# Patient Record
Sex: FEMALE | Race: BLACK OR AFRICAN AMERICAN | NOT HISPANIC OR LATINO | Employment: OTHER | URBAN - METROPOLITAN AREA
[De-identification: names, ages, dates, MRNs, and addresses within clinical notes are randomized per-mention and may not be internally consistent; named-entity substitution may affect disease eponyms.]

---

## 2024-09-30 ENCOUNTER — HOSPITAL ENCOUNTER (EMERGENCY)
Facility: HOSPITAL | Age: 59
Discharge: HOME | End: 2024-10-01
Payer: MEDICARE

## 2024-09-30 VITALS
HEIGHT: 62 IN | SYSTOLIC BLOOD PRESSURE: 134 MMHG | BODY MASS INDEX: 30.91 KG/M2 | DIASTOLIC BLOOD PRESSURE: 61 MMHG | RESPIRATION RATE: 20 BRPM | HEART RATE: 82 BPM | TEMPERATURE: 97.6 F | OXYGEN SATURATION: 100 % | WEIGHT: 168 LBS

## 2024-09-30 DIAGNOSIS — M79.641 BILATERAL HAND PAIN: ICD-10-CM

## 2024-09-30 DIAGNOSIS — R22.9 SUBCUTANEOUS NODULES: ICD-10-CM

## 2024-09-30 DIAGNOSIS — M79.642 BILATERAL HAND PAIN: ICD-10-CM

## 2024-09-30 DIAGNOSIS — M79.604 LEG PAIN, BILATERAL: Primary | ICD-10-CM

## 2024-09-30 DIAGNOSIS — M79.605 LEG PAIN, BILATERAL: Primary | ICD-10-CM

## 2024-09-30 PROCEDURE — 99284 EMERGENCY DEPT VISIT MOD MDM: CPT

## 2024-09-30 PROCEDURE — 99283 EMERGENCY DEPT VISIT LOW MDM: CPT

## 2024-10-01 ENCOUNTER — APPOINTMENT (EMERGENCY)
Dept: RADIOLOGY | Facility: HOSPITAL | Age: 59
End: 2024-10-01
Payer: MEDICARE

## 2024-10-01 LAB
ALBUMIN SERPL BCG-MCNC: 3.9 G/DL (ref 3.5–5)
ALP SERPL-CCNC: 68 U/L (ref 34–104)
ALT SERPL W P-5'-P-CCNC: 9 U/L (ref 7–52)
ANION GAP SERPL CALCULATED.3IONS-SCNC: 8 MMOL/L (ref 4–13)
AST SERPL W P-5'-P-CCNC: 14 U/L (ref 13–39)
BASOPHILS # BLD AUTO: 0.02 THOUSANDS/ÂΜL (ref 0–0.1)
BASOPHILS NFR BLD AUTO: 0 % (ref 0–1)
BILIRUB SERPL-MCNC: 0.28 MG/DL (ref 0.2–1)
BUN SERPL-MCNC: 19 MG/DL (ref 5–25)
CALCIUM SERPL-MCNC: 9.5 MG/DL (ref 8.4–10.2)
CHLORIDE SERPL-SCNC: 102 MMOL/L (ref 96–108)
CO2 SERPL-SCNC: 27 MMOL/L (ref 21–32)
CREAT SERPL-MCNC: 0.79 MG/DL (ref 0.6–1.3)
CRP SERPL QL: 46.6 MG/L
EOSINOPHIL # BLD AUTO: 0.05 THOUSAND/ÂΜL (ref 0–0.61)
EOSINOPHIL NFR BLD AUTO: 1 % (ref 0–6)
ERYTHROCYTE [DISTWIDTH] IN BLOOD BY AUTOMATED COUNT: 13.8 % (ref 11.6–15.1)
ERYTHROCYTE [SEDIMENTATION RATE] IN BLOOD: 119 MM/HOUR (ref 0–29)
GFR SERPL CREATININE-BSD FRML MDRD: 82 ML/MIN/1.73SQ M
GLUCOSE SERPL-MCNC: 104 MG/DL (ref 65–140)
HCT VFR BLD AUTO: 30.4 % (ref 34.8–46.1)
HGB BLD-MCNC: 9.6 G/DL (ref 11.5–15.4)
IMM GRANULOCYTES # BLD AUTO: 0.04 THOUSAND/UL (ref 0–0.2)
IMM GRANULOCYTES NFR BLD AUTO: 0 % (ref 0–2)
LYMPHOCYTES # BLD AUTO: 3.1 THOUSANDS/ÂΜL (ref 0.6–4.47)
LYMPHOCYTES NFR BLD AUTO: 31 % (ref 14–44)
MCH RBC QN AUTO: 25.7 PG (ref 26.8–34.3)
MCHC RBC AUTO-ENTMCNC: 31.6 G/DL (ref 31.4–37.4)
MCV RBC AUTO: 82 FL (ref 82–98)
MONOCYTES # BLD AUTO: 0.66 THOUSAND/ÂΜL (ref 0.17–1.22)
MONOCYTES NFR BLD AUTO: 7 % (ref 4–12)
NEUTROPHILS # BLD AUTO: 6.17 THOUSANDS/ÂΜL (ref 1.85–7.62)
NEUTS SEG NFR BLD AUTO: 61 % (ref 43–75)
NRBC BLD AUTO-RTO: 0 /100 WBCS
PLATELET # BLD AUTO: 359 THOUSANDS/UL (ref 149–390)
PMV BLD AUTO: 9.3 FL (ref 8.9–12.7)
POTASSIUM SERPL-SCNC: 4 MMOL/L (ref 3.5–5.3)
PROT SERPL-MCNC: 9.2 G/DL (ref 6.4–8.4)
RBC # BLD AUTO: 3.73 MILLION/UL (ref 3.81–5.12)
SODIUM SERPL-SCNC: 137 MMOL/L (ref 135–147)
WBC # BLD AUTO: 10.04 THOUSAND/UL (ref 4.31–10.16)

## 2024-10-01 PROCEDURE — 85652 RBC SED RATE AUTOMATED: CPT

## 2024-10-01 PROCEDURE — 71046 X-RAY EXAM CHEST 2 VIEWS: CPT

## 2024-10-01 PROCEDURE — 80053 COMPREHEN METABOLIC PANEL: CPT

## 2024-10-01 PROCEDURE — 36415 COLL VENOUS BLD VENIPUNCTURE: CPT

## 2024-10-01 PROCEDURE — 85025 COMPLETE CBC W/AUTO DIFF WBC: CPT

## 2024-10-01 PROCEDURE — 96374 THER/PROPH/DIAG INJ IV PUSH: CPT

## 2024-10-01 PROCEDURE — 86140 C-REACTIVE PROTEIN: CPT

## 2024-10-01 RX ORDER — KETOROLAC TROMETHAMINE 30 MG/ML
15 INJECTION, SOLUTION INTRAMUSCULAR; INTRAVENOUS ONCE
Status: COMPLETED | OUTPATIENT
Start: 2024-10-01 | End: 2024-10-01

## 2024-10-01 RX ORDER — NAPROXEN 500 MG/1
500 TABLET ORAL 2 TIMES DAILY WITH MEALS
Qty: 30 TABLET | Refills: 0 | Status: SHIPPED | OUTPATIENT
Start: 2024-10-01

## 2024-10-01 RX ORDER — METHOCARBAMOL 500 MG/1
500 TABLET, FILM COATED ORAL 2 TIMES DAILY
Qty: 20 TABLET | Refills: 0 | Status: SHIPPED | OUTPATIENT
Start: 2024-10-01

## 2024-10-01 RX ADMIN — KETOROLAC TROMETHAMINE 15 MG: 30 INJECTION, SOLUTION INTRAMUSCULAR at 01:52

## 2024-10-01 NOTE — ED PROVIDER NOTES
Final diagnoses:   Leg pain, bilateral   Bilateral hand pain   Subcutaneous nodules     ED Disposition       ED Disposition   Discharge    Condition   Stable    Date/Time   Tue Oct 1, 2024  1:58 AM    Comment   Emily Mejia discharge to home/self care.                   Assessment & Plan       Medical Decision Making  The patient is a 59 y.o. female who presents to [facility] with a chief complaint of painful bumps to the legs and hands bilaterally  Patient is well-appearing, afebrile with normal vital signs.  Ddx includes but is not limited to erythema nodosum, autoimmune disease, RA, arthritis  Patient reports that she does not take any medications and has had no recent illnesses. Patient is in no acute distress. CBC and CMP without signs of leukocytosis and leukopenia, electrolyte imbalances, abnormal liver or kidney function. CRP and sed rate elevated, concerning for an inflammatory/auto immune process. Discussed the results with the patient and recommended NSAIDs and follow up with rheumatology. Discussed strict return parameters including but not limited to chest pain, shortness of breath, fever, or worsening symptoms. Patient understands and agrees with treatment plana and follow up. Prior to discharge, discharge instructions were discussed with patient at bedside. Patient was provided both verbal and written instructions. Patient is understanding of the discharge instructions and is agreeable to plan of care. Return precautions were discussed with patient bedside, patient verbalized understanding of signs and symptoms that would necessitate return to the ED. All questions were answered. Patient was comfortable with the plan of care and discharged to home.       Problems Addressed:  Bilateral hand pain: acute illness or injury  Leg pain, bilateral: acute illness or injury  Subcutaneous nodules: acute illness or injury    Amount and/or Complexity of Data Reviewed  Labs: ordered.  Radiology:  "ordered.    Risk  Prescription drug management.             Medications   ketorolac (TORADOL) injection 15 mg (15 mg Intravenous Given 10/1/24 0152)       ED Risk Strat Scores                           SBIRT 22yo+      Flowsheet Row Most Recent Value   Initial Alcohol Screen: US AUDIT-C     1. How often do you have a drink containing alcohol? 0 Filed at: 09/30/2024 2311   2. How many drinks containing alcohol do you have on a typical day you are drinking?  0 Filed at: 09/30/2024 2311   3b. FEMALE Any Age, or MALE 65+: How often do you have 4 or more drinks on one occassion? 0 Filed at: 09/30/2024 2311   Audit-C Score 0 Filed at: 09/30/2024 2311   JAMEE: How many times in the past year have you...    Used an illegal drug or used a prescription medication for non-medical reasons? Never Filed at: 09/30/2024 2311                            History of Present Illness       Chief Complaint   Patient presents with    Hand Pain     C/o \"lumps\" on hands that she believes are due to iron infusions. C/o pain        History reviewed. No pertinent past medical history.   History reviewed. No pertinent surgical history.   History reviewed. No pertinent family history.   Social History     Tobacco Use    Smoking status: Never    Smokeless tobacco: Never   Vaping Use    Vaping status: Never Used   Substance Use Topics    Drug use: Yes     Types: Marijuana      E-Cigarette/Vaping    E-Cigarette Use Never User       E-Cigarette/Vaping Substances      I have reviewed and agree with the history as documented.     The patient is a 59 y.o. female with PMHx of anemia who presents to [facility] with a chief complaint of painful bumps to the legs and hands bilaterally. Symptoms began back in February with some lumps on her legs bilaterally and now have also showed up on her hands as well bilaterally. Patient is well-appearing and in no acute distress. Her pain is currently rated as a 4/10 in severity and described as " tenderness to the nodules without radiation. Associated symptoms include joint pain. Symptoms are aggravated with movement and palpitation and alleviating factors include none noted. The patient denies fever, chills, night sweats, chest pain, cough, shortness of breath, sputum, dizziness, lightness, numbness, tingling, falls, trauma, coldness of extremity, gait instability. No other reported symptoms at this time.  Patient denies allergies to anything          History provided by:  Patient   used: No    Hand Pain  Associated symptoms: no abdominal pain, no chest pain, no cough, no ear pain, no fever, no rash, no shortness of breath, no sore throat and no vomiting      History reviewed. No pertinent past medical history.     Review of Systems   Constitutional:  Negative for chills and fever.   HENT:  Negative for ear pain and sore throat.    Eyes:  Negative for pain and visual disturbance.   Respiratory:  Negative for cough and shortness of breath.    Cardiovascular:  Negative for chest pain and palpitations.   Gastrointestinal:  Negative for abdominal pain and vomiting.   Genitourinary:  Negative for dysuria and hematuria.   Musculoskeletal:  Positive for arthralgias. Negative for back pain.   Skin:  Negative for color change and rash.   Neurological:  Negative for seizures and syncope.   All other systems reviewed and are negative.          Objective       ED Triage Vitals   Temperature Pulse Blood Pressure Respirations SpO2 Patient Position - Orthostatic VS   09/30/24 2312 09/30/24 2312 09/30/24 2312 09/30/24 2312 09/30/24 2312 09/30/24 2312   97.6 °F (36.4 °C) 82 134/61 20 100 % Sitting      Temp Source Heart Rate Source BP Location FiO2 (%) Pain Score    09/30/24 2312 09/30/24 2312 09/30/24 2312 -- 10/01/24 0152    Temporal Monitor Left arm  6      Vitals      Date and Time Temp Pulse SpO2 Resp BP Pain Score FACES Pain Rating User   10/01/24 0152 -- -- -- -- -- 6 -- JK   09/30/24 2312 97.6 °F  (36.4 °C) 82 100 % 20 134/61 -- -- AG            Physical Exam  Vitals reviewed.   Constitutional:       General: She is not in acute distress.     Appearance: Normal appearance. She is not ill-appearing or diaphoretic.   HENT:      Head: Normocephalic.      Nose: Nose normal.      Mouth/Throat:      Mouth: Mucous membranes are moist.      Pharynx: Oropharynx is clear.   Eyes:      General: No scleral icterus.     Conjunctiva/sclera: Conjunctivae normal.   Neck:      Vascular: No carotid bruit.   Cardiovascular:      Rate and Rhythm: Normal rate.      Pulses: Normal pulses.   Pulmonary:      Effort: Pulmonary effort is normal. No respiratory distress.      Breath sounds: Normal breath sounds. No stridor. No wheezing, rhonchi or rales.   Chest:      Chest wall: No tenderness.   Abdominal:      General: Abdomen is flat. Bowel sounds are normal.      Tenderness: There is no abdominal tenderness.   Musculoskeletal:         General: Tenderness present. No deformity or signs of injury. Normal range of motion.      Cervical back: Neck supple. No rigidity or tenderness.      Right lower leg: No edema.      Left lower leg: No edema.        Legs:       Comments: Full ROM of the lower extremities, subcutaneous nodules to the bilateral lower extremities, no tenderness to the calves, negative homans sign, pedal pulses 2+, cap refill <2 sec    Full ROM of the upper extremities, subcutaneous nodules to the bilateral hands, tenderness, radial pulses 2+, cap refill <2 sec, sensation and motor intact   Skin:     General: Skin is warm and dry.      Capillary Refill: Capillary refill takes less than 2 seconds.      Coloration: Skin is not jaundiced.      Findings: No bruising or lesion.   Neurological:      Mental Status: She is alert and oriented to person, place, and time. Mental status is at baseline.         Results Reviewed       Procedure Component Value Units Date/Time    Sedimentation rate, automated [[id number]]  (Abnormal)  Collected: 10/01/24 0021    Lab Status: Final result Specimen: Blood from Arm, Right Updated: 10/01/24 0100     Sed Rate 119 mm/hour     Comprehensive metabolic panel [[id number]]  (Abnormal) Collected: 10/01/24 0021    Lab Status: Final result Specimen: Blood from Arm, Right Updated: 10/01/24 0100     Sodium 137 mmol/L      Potassium 4.0 mmol/L      Chloride 102 mmol/L      CO2 27 mmol/L      ANION GAP 8 mmol/L      BUN 19 mg/dL      Creatinine 0.79 mg/dL      Glucose 104 mg/dL      Calcium 9.5 mg/dL      AST 14 U/L      ALT 9 U/L      Alkaline Phosphatase 68 U/L      Total Protein 9.2 g/dL      Albumin 3.9 g/dL      Total Bilirubin 0.28 mg/dL      eGFR 82 ml/min/1.73sq m     Narrative:      National Kid[employer] Foundation guidelines for Chronic Kidney Disease (CKD):     Stage 1 with normal or high GFR (GFR > 90 mL/min/1.73 square meters)    Stage 2 Mild CKD (GFR = 60-89 mL/min/1.73 square meters)    Stage 3A Moderate CKD (GFR = 45-59 mL/min/1.73 square meters)    Stage 3B Moderate CKD (GFR = 30-44 mL/min/1.73 square meters)    Stage 4 Severe CKD (GFR = 15-29 mL/min/1.73 square meters)    Stage 5 End Stage CKD (GFR <15 mL/min/1.73 square meters)  Note: GFR calculation is accurate only with a steady state creatinine    C-reactive protein [[id number]]  (Abnormal) Collected: 10/01/24 0021    Lab Status: Final result Specimen: Blood from Arm, Right Updated: 10/01/24 0100     CRP 46.6 mg/L     CBC and differential [[id number]]  (Abnormal) Collected: 10/01/24 0021    Lab Status: Final result Specimen: Blood from Arm, Right Updated: 10/01/24 0028     WBC 10.04 Thousand/uL      RBC 3.73 Million/uL      Hemoglobin 9.6 g/dL      Hematocrit 30.4 %      MCV 82 fL      MCH 25.7 pg      MCHC 31.6 g/dL      RDW 13.8 %      MPV 9.3 fL      Platelets 359 Thousands/uL      nRBC 0 /100 WBCs      Segmented % 61 %      Immature Grans % 0 %      Lymphocytes % 31 %      Monocytes % 7 %      Eosinophils Relative 1 %      Basophils  Relative 0 %      Absolute Neutrophils 6.17 Thousands/µL      Absolute Immature Grans 0.04 Thousand/uL      Absolute Lymphocytes 3.10 Thousands/µL      Absolute Monocytes 0.66 Thousand/µL      Eosinophils Absolute 0.05 Thousand/µL      Basophils Absolute 0.02 Thousands/µL             XR chest 2 views    (Results Pending)       Procedures    ED Medication and Procedure Management   None     Discharge Medication List as of 10/1/2024  1:59 AM        START taking these medications    Details   methocarbamol (ROBAXIN) 500 mg tablet Take 1 tablet (500 mg total) by mouth 2 (two) times a day, Starting Tue 10/1/2024, Normal      naproxen (Naprosyn) 500 mg tablet Take 1 tablet (500 mg total) by mouth 2 (two) times a day with meals, Starting Tue 10/1/2024, Normal             ED SEPSIS DOCUMENTATION   Time reflects when diagnosis was documented in both MDM as applicable and the Disposition within this note       Time User Action Codes Description Comment    10/1/2024  1:58 AM Nishant Hernandes [M79.604,  M79.605] Leg pain, bilateral     10/1/2024  1:58 AM Nishant Hernandes [M79.641,  M79.642] Bilateral hand pain     10/1/2024  1:58 AM Nishant Hernandes [R22.9] Subcutaneous nodules                  Nishant Hernandes PA-C  10/01/24 0544

## 2024-10-01 NOTE — DISCHARGE INSTRUCTIONS
Follow-up with PCP and rheumatology.  Take medicine as directed.  If any symptoms worsen or new symptoms appear return to the ER.

## 2024-10-08 ENCOUNTER — CONSULT (OUTPATIENT)
Age: 59
End: 2024-10-08
Payer: MEDICARE

## 2024-10-08 VITALS
BODY MASS INDEX: 30 KG/M2 | SYSTOLIC BLOOD PRESSURE: 136 MMHG | TEMPERATURE: 95.1 F | HEART RATE: 68 BPM | WEIGHT: 163 LBS | HEIGHT: 62 IN | OXYGEN SATURATION: 98 % | DIASTOLIC BLOOD PRESSURE: 84 MMHG

## 2024-10-08 DIAGNOSIS — R22.9 SUBCUTANEOUS NODULES: ICD-10-CM

## 2024-10-08 DIAGNOSIS — M79.642 BILATERAL HAND PAIN: ICD-10-CM

## 2024-10-08 DIAGNOSIS — M79.604 LEG PAIN, BILATERAL: ICD-10-CM

## 2024-10-08 DIAGNOSIS — M79.641 BILATERAL HAND PAIN: ICD-10-CM

## 2024-10-08 DIAGNOSIS — M79.605 LEG PAIN, BILATERAL: ICD-10-CM

## 2024-10-08 DIAGNOSIS — D86.9 SARCOIDOSIS: ICD-10-CM

## 2024-10-08 DIAGNOSIS — L52 ERYTHEMA NODOSUM: Primary | ICD-10-CM

## 2024-10-08 PROCEDURE — 99205 OFFICE O/P NEW HI 60 MIN: CPT | Performed by: STUDENT IN AN ORGANIZED HEALTH CARE EDUCATION/TRAINING PROGRAM

## 2024-10-08 RX ORDER — ROSUVASTATIN CALCIUM 5 MG/1
TABLET, COATED ORAL
COMMUNITY
Start: 2024-07-02

## 2024-10-08 RX ORDER — PREDNISONE 5 MG/1
TABLET ORAL
Qty: 44 TABLET | Refills: 0 | Status: SHIPPED | OUTPATIENT
Start: 2024-10-08 | End: 2024-11-01

## 2024-10-08 RX ORDER — ATROPINE SULFATE 0.01 %
DROPS OPHTHALMIC (EYE)
COMMUNITY
Start: 2000-10-07

## 2024-10-08 NOTE — ASSESSMENT & PLAN NOTE
Pt presenting with bilateral LE nodules which are painful and skin discoloration, what looks like lupus pernio on her nose, etc, consistent with erythema nodosum, and possible underlying sarcoidosis. 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   Plan:  Will check inflammatory markers, ACE, vitamin D 1,25  Prednisone taper prescribed  Will refer her to Derm/Rheum clinic for biopsy later this month  Orders:    Sedimentation rate, automated; Future    C-reactive protein; Future    Angiotensin converting enzyme; Future    Vitamin D 1,25 Dihydroxy; Future    predniSONE 5 mg tablet; Take 4 tablets (20 mg total) by mouth daily for 5 days, THEN 2 tablets (10 mg total) daily for 5 days, THEN 1 tablet (5 mg total) daily for 14 days.    Sedimentation rate, automated    C-reactive protein    Angiotensin converting enzyme    Vitamin D 1,25 Dihydroxy

## 2024-10-08 NOTE — PATIENT INSTRUCTIONS
We will have Dermatology clinic call you to schedule biopsy of your leg nodules.      We ordered blood work for you to have done    Take prednisone as prescribed

## 2024-10-08 NOTE — PROGRESS NOTES
Ambulatory Visit  Name: Emily Mejia      : 1965      MRN: [id number]  Encounter Provider: Janice Antoine MD  Encounter Date: 10/8/2024   Encounter department: [facility] RHEUMATOLOGY [facility]    Assessment & Plan  Erythema nodosum  Pt presenting with bilateral LE nodules which are painful and skin discoloration, what looks like lupus pernio on her nose, etc, consistent with erythema nodosum, and possible underlying sarcoidosis. 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   Plan:  Will check inflammatory markers, ACE, vitamin D 1,25  Prednisone taper prescribed  Will refer her to Derm/Rheum clinic for biopsy later this month  Orders:    Sedimentation rate, automated; Future    C-reactive protein; Future    Angiotensin converting enzyme; Future    Vitamin D 1,25 Dihydroxy; Future    predniSONE 5 mg tablet; Take 4 tablets (20 mg total) by mouth daily for 5 days, THEN 2 tablets (10 mg total) daily for 5 days, THEN 1 tablet (5 mg total) daily for 14 days.    Sedimentation rate, automated    C-reactive protein    Angiotensin converting enzyme    Vitamin D 1,25 Dihydroxy    Sarcoidosis  CXR without obvious hilar LAD. Preexisting uveitis and now showing erythema nodosum and possible lupus pernio.  Plan:  As above  Orders:    Sedimentation rate, automated; Future    C-reactive protein; Future    Angiotensin converting enzyme; Future    Vitamin D 1,25 Dihydroxy; Future    predniSONE 5 mg tablet; Take 4 tablets (20 mg total) by mouth daily for 5 days, THEN 2 tablets (10 mg total) daily for 5 days, THEN 1 tablet (5 mg total) daily for 14 days.    Sedimentation rate, automated    C-reactive protein    Angiotensin converting enzyme    Vitamin D 1,25 Dihydroxy      History of Present Illness     Emily Mejia is a 59 y.o. 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 He was recently in the ER with these complaints.  "Symptoms began months ago and she noticed some bumps/nodules on her legs bilaterally which then later showed in her hands. They are tender to touch.     Personally reviewed labs showing elevated , and CRP 46.    During her ED visit a week ago CXR was unremarkable, no LAD.     Patient denies any swollen or tender joints.     She has tenderness and bruise like lesions in the hands palmar/ ulnar side bilaterally.  She also has a new lesion on her nose that is new from a few days ago.     Denies fatigue, weight changes, hair loss, vision changes, erythema or pain in the eyes, sicca symptoms, ulcers in the mouth/genital area, swollen glands, dysphagia, GERD, rashes (including malar, psoriasis, photosensitivity, skin tightening/thickening), bumps under the skin (calcinosis, tophi, RA nodules), Raynaud's, morning stiffness, swollen/tender joints, SOB, pleuritic chest pain, abdominal pain, changes in the stool,  bloody or foamy urine, LE edema, muscle weakness, neuropathy, joint hypermobility     Denies any autoimmune family history.     Denies history of DVT or PE, miscarriages, seizures or strokes.             Review of Systems   Constitutional: Negative.    HENT: Negative.     Eyes:         Blind in both eyes   Respiratory: Negative.     Cardiovascular: Negative.    Gastrointestinal: Negative.    Musculoskeletal:  Positive for arthralgias and joint swelling.   Skin:  Positive for wound.   Hematological:  Bruises/bleeds easily.           Objective     /84   Pulse 68   Temp (!) 95.1 °F (35.1 °C) (Tympanic)   Ht 5' 2\" (1.575 m)   Wt 73.9 kg (163 lb)   SpO2 98%   BMI 29.81 kg/m²     Physical Exam  Constitutional:       Appearance: Normal appearance.   HENT:      Head: Normocephalic.   Eyes:      Comments: Legally blind   Cardiovascular:      Pulses: Normal pulses.      Heart sounds: Normal heart sounds.   Pulmonary:      Effort: Pulmonary effort is normal.      Breath sounds: Normal breath sounds. "   Musculoskeletal:      Comments: MSK Exam  The following areas have been examined today and do not show any signs of synovitis, tenderness, or restricted ROM unless otherwise specified below:  Shoulders  Elbows  Wrists  Hands  Hips  Knees  Ankles - mild tenderness in bilateral posterior ankles  Feet    Skin:     General: Skin is warm and dry.      Findings: Bruising (mild petechiae like findings on ulnar/palmar side of both hands , 2nd digit fingertip of left hand) and lesion (purple lesion on nose, nodules on bilateral legs that are tender to touch) present.

## 2024-10-25 ENCOUNTER — OFFICE VISIT (OUTPATIENT)
Dept: DERMATOLOGY | Facility: CLINIC | Age: 59
End: 2024-10-25

## 2024-10-25 VITALS — TEMPERATURE: 97.2 F | HEIGHT: 62 IN | BODY MASS INDEX: 30 KG/M2 | WEIGHT: 163 LBS

## 2024-10-25 DIAGNOSIS — L52 ERYTHEMA NODOSUM: Primary | ICD-10-CM

## 2024-10-25 PROCEDURE — 88305 TISSUE EXAM BY PATHOLOGIST: CPT | Performed by: DERMATOLOGY

## 2024-10-25 PROCEDURE — 88312 SPECIAL STAINS GROUP 1: CPT | Performed by: DERMATOLOGY

## 2024-10-25 NOTE — PATIENT INSTRUCTIONS
ERYTHEMA NODOSUM      Assessment and Plan:  Based on a thorough discussion of this condition and the management approach to it (including a comprehensive discussion of the known risks, side effects and potential benefits of treatment), the patient (family) agrees to implement the following specific plan:  Recommend seeing a podiatrist for further imaging   Referral placed  Continue to finish course of Prednisone   Punch biopsy performed in office today   Consent form signed in office     What is erythema nodosum?  Erythema nodosum is a skin condition where red lumps form on the shins, and less commonly the thighs and forearms. It is a type of panniculitis, i.e. an inflammatory disorder affecting subcutaneous fat.    Who gets erythema nodosum?  Most cases of erythema nodosum occur between the ages of 20 and 45, with a peak from 20 to 30. It occurs less often in the elderly and in children. It is 3 to 6 times more common in adult women than in adult men. However the sex incidence before puberty is about equal.  Most people with erythema nodosum are otherwise in good health but it is often associated with recent infection or illness.    What are the causes of erythema nodosum?  Erythema nodosum appears to be a hypersensitivity reaction with a number of different causes.    Common causes of erythema nodosum are:  Throat infections; these may be due to streptococccus, or viral in origin.  Other bacterial infections, such as Mycoplasma pneumoniae.  Sarcoidosis; erythema nodosum is often associated with enlargement of the lymph nodes (bihilar lymphadenopathy) in the lungs in sarcoidosis. This is known as Löfgren syndrome. It may result in a dry cough or some shortness of breath.  Tuberculosis (TB); erythema nodosum occurs with the primary infection with TB.   Pregnancy or the oral contraceptive pill; erythema nodosum may occur after the first 2 or 3 cycles on the pill. EN may occur in pregnancy, clear after delivery, then  "recur in subsequent pregnancies.  Other drugs; other drugs which have been reported to cause erythema nodosum include: sulphonamides, saliclyates, other nonsteroidal anti-inflammatory drugs (NSAIDs), bromides, iodides and gold salts.  Inflammatory bowel disease (ulcerative colitis or Crohn disease)  Other causes; there are many other causes of erythema nodosum  Erythema nodosum leprosum is a particular variant of erythema nodosum that affects some people being treated for leprosy, and is more usually called type 2 lepra reaction.    Clinical presentation of erythema nodosum    Symptoms of underlying disease may be present in some patients with erythema nodosum, e.g. sore throat in those with streptococcal infection.    Red lumps appear on the shins or about the knees and ankle. They vary in size between a cherry and a grapefruit and in number from 2 to 50 or more. Usually there are 6-12 lumps on the front and sides of the legs and knees. The thighs, outer aspects of the arms, face and neck are less frequently involved and at these other sites the lesions are smaller and more superficial. The nodules are slightly raised above the surrounding skin; they are hot and painful, bright red when they first appear, later becoming purple then fading through the colour changes of a bruise.    Erythema nodosum nodules continue to erupt for about 10 days. The \"bruising\" colour-change starts in the second week, becomes most marked in the third week, then subsides at any time from the end of the third week to the sixth week. Aching of the legs and swelling of the ankles may persist for some weeks, especially if the patient does not rest up. New crops of erythema nodosum may occur over a number of weeks. Rarely, 2 or 3 large lesions merge to form a crescentic ring, which spreads for some days before fading.    Other symptoms may include:  Fever, general aching and feeling unwell (malaise) especially when the nodules first " occur.  Joint aches or arthralgias occur in over half of those presenting with erythema nodosum, regardless of cause. The knee jonts are almost always affected, the other large joints less commonly. Joint symptoms may persist for months afterwards but always resolve completely.  Conjunctivitis is less frequent.    How is erythema nodosum diagnosed?  The skin disorder is diagnosed clinically and a skin biopsy is often performed. The pathology of erythema nodosum shows inflammation around the septum between lobules of fat in subcutaneous tissue, without vasculitis.    Tests done in patients with erythema nodosum include:  Throat swab  Sputum or gastric washing if TB is suspected  Complete blood count and C-reactive protein (CRP) and/or erythrocyte sedimentation rate (ESR)  ASO titre (a test for streptococcal infection)  Chest X-ray (looking for TB and sarcoidosis)  Virus studies  Yersinia titres  Mantoux test or QuantiFERON gold (tests for TB)    Treatment of erythema nodosum  If an underlying infection is found, this should be treated.  Bed rest is advised if pain and/or swelling is severe.  Firm supportive bandages or light compression stockings should be worn.  Anti-inflammatory medications reduce discomfort  Potassium iodide has been reported to be effective but is not easy to obtain.  Oral tetracyclines have anti-inflammatory properties and may reduce discomfort and duration of disease  Mild cases of erythema nodosum subside in 3 to 6 weeks. Cropping of new lesions may occur within this time, especially if the patient is not resting. Sometimes, erythema nodosum may become a chronic or persistent disorder lasting for 6 months and occasionally for years.    PROCEDURE NOTE:  PUNCH BIOPSY        Procedure Details     Patient informed of the risks (including bleeding,scaring and infection) and benefits of the procedure explained. Verbal and written informed consent obtained. The area was prepped and draped in the  usual fashion. Anesthesia was obtained with 1% lidocaine with epinephrine. The skin was then stretched perpendicular to the skin tension lines and a punch biopsy to an appropriate sampling depth was obtained with a 4 mm & 6 mm punch with a forceps and iris scissors.     Hemostasis was obtained with 4-0 Prolene x 2 sutures.     Complications:  None      Specimen has been sent for review by Dermatopathology.      Plan:  1. Instructed to keep the wound dry and covered for 24-48h and clean thereafter.  2. Warning signs of infection were reviewed.    3. Recommended that the patient use acetaminophen as needed for pain  4. Sutures if any should be removed in 10-14 days      Standard post-procedure care has been explained and has been included in written form within the patient's copy of Informed Consent.

## 2024-10-25 NOTE — PROGRESS NOTES
"[facility] Dermatology Clinic Note     Patient Name: Emily Mejia  Encounter Date: 10/25/24     Have you been cared for by a [facility] Dermatologist in the last 3 years and, if so, which description applies to you?    NO.   I am considered a \"new\" patient and must complete all patient intake questions. I am FEMALE/of child-bearing potential.    REVIEW OF SYSTEMS:  Have you recently had or currently have any of the following? Recent fever or chills? No  Any non-healing wound? No  Are you pregnant or planning to become pregnant? No  Are you currently or planning to be nursing or breast feeding? No   PAST MEDICAL HISTORY:  Have you personally ever had or currently have any of the following?  If \"YES,\" then please provide more detail. Skin cancer (such as Melanoma, Basal Cell Carcinoma, Squamous Cell Carcinoma?  No  Tuberculosis, HIV/AIDS, Hepatitis B or C: No  Radiation Treatment No   HISTORY OF IMMUNOSUPPRESSION:   Do you have a history of any of the following:  Systemic Immunosuppression such as Diabetes, Biologic or Immunotherapy, Chemotherapy, Organ Transplantation, Bone Marrow Transplantation or Prednsione?  No    Answering \"YES\" requires the addition of the dotphrase \"IMMUNOSUPPRESSED\" as the first diagnosis of the patient's visit.   FAMILY HISTORY:  Any \"first degree relatives\" (parent, brother, sister, or child) with the following?    Skin Cancer, Pancreatic or Other Cancer? YES, sister-breast, father-unsure which one    PATIENT EXPERIENCE:    Do you want the Dermatologist to perform a COMPLETE skin exam today including a clinical examination under the \"bra and underwear\" areas?  NO  If necessary, do we have your permission to call and leave a detailed message on your Preferred Phone number that includes your specific medical information?  Yes      No Known Allergies   Current Outpatient Medications:     Atropine Sulfate 0.01 % SOLN, , Disp: , Rfl:     DORZOLAMIDE HCL OP, , Disp: , Rfl:     methocarbamol " (ROBAXIN) 500 mg tablet, Take 1 tablet (500 mg total) by mouth 2 (two) times a day (Patient not taking: Reported on 10/8/2024), Disp: 20 tablet, Rfl: 0    naproxen (Naprosyn) 500 mg tablet, Take 1 tablet (500 mg total) by mouth 2 (two) times a day with meals (Patient not taking: Reported on 10/8/2024), Disp: 30 tablet, Rfl: 0    predniSONE 5 mg tablet, Take 4 tablets (20 mg total) by mouth daily for 5 days, THEN 2 tablets (10 mg total) daily for 5 days, THEN 1 tablet (5 mg total) daily for 14 days., Disp: 44 tablet, Rfl: 0    rosuvastatin (CRESTOR) 5 mg tablet, , Disp: , Rfl:           Whom besides the patient is providing clinical information about today's encounter?   NO ADDITIONAL HISTORIAN (patient alone provided history)    Physical Exam and Assessment/Plan by Diagnosis:    ERYTHEMA NODOSUM    Physical Exam:  Anatomic Location Affected:  bl shins, forearms, hands   Morphological Description:  tender subcutaneous nodules resolving w hyperpigmentation      Latest Reference Range & Units 10/01/24 00:21   Sed Rate 0 - 29 mm/hour 119 (H)   C-REACTIVE PROTEIN <3.0 mg/L 46.6 (H)   (H): Data is abnormally high     Latest Reference Range & Units 10/01/24 00:21   Sodium 135 - 147 mmol/L 137   Potassium 3.5 - 5.3 mmol/L 4.0   Chloride 96 - 108 mmol/L 102   Carbon Dioxide 21 - 32 mmol/L 27   ANION GAP 4 - 13 mmol/L 8   BUN 5 - 25 mg/dL 19   Creatinine 0.60 - 1.30 mg/dL 0.79   GLUCOSE 65 - 140 mg/dL 104   Calcium 8.4 - 10.2 mg/dL 9.5   AST 13 - 39 U/L 14   ALT 7 - 52 U/L 9   ALK PHOS 34 - 104 U/L 68   Total Protein 6.4 - 8.4 g/dL 9.2 (H)   Albumin 3.5 - 5.0 g/dL 3.9   Total Bilirubin 0.20 - 1.00 mg/dL 0.28   GFR, Calculated ml/min/1.73sq m 82   (H): Data is abnormally high     Latest Reference Range & Units 10/01/24 00:21   WBC 4.31 - 10.16 Thousand/uL 10.04   RBC 3.81 - 5.12 Million/uL 3.73 (L)   Hemoglobin 11.5 - 15.4 g/dL 9.6 (L)   Hematocrit 34.8 - 46.1 % 30.4 (L)   MCV 82 - 98 fL 82   MCH 26.8 - 34.3 pg 25.7 (L)   MCHC  31.4 - 37.4 g/dL 31.6   RDW 11.6 - 15.1 % 13.8   Platelet Count 149 - 390 Thousands/uL 359   MPV 8.9 - 12.7 fL 9.3   nRBC /100 WBCs 0   (L): Data is abnormally low      Additional History of Present Condition:  60 yo female with recurrent painful tender nodules on arm and legs. Appeared in March of 2024. Went to ER on 9/30/24 due to severe arm and leg pain. Pt was informed to follow up with Rheumatologist who then told pt to follow up with dermatology for possible biopsy. Currently on prednisone which is helping improve condition, also takes tylenol which helps tenderness. Had labwork and imaging done to r/o sarcoid. CXR w/o bl hilar lymphadenopathy, CRP and sed rate elevated, vit d and ace wnl. Pt also c/o L heel pain after trauma unrelated to rash. Pt also has tender papules of fingertips and palms that are resolving, does not believe they are exacerbated by cold. Pt also reports having a lesion on her nose that has also resolved which was concerning for lupus pernio per rheumatology note. She is completing her prednisone taper which has led to significant improvement. She has one subtle residual lesion on left leg. Arms and right leg lesions primarily just have hyperpigmentation remaining. 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    Assessment and Plan: Skin lesions do appear most likely erythema nodosum or other variant of panniculitis. Hand and nose lesions have now resolved, and etiolgoy unclear. Could represent lupus pernio on nose, or typical perniosis on hands. No lesions to biopsy in these sites today.    Based on a thorough discussion of this condition and the management approach to it (including a comprehensive discussion of the known risks, side effects and potential benefits of treatment), the patient (family) agrees to implement the following specific plan:  Recommend seeing a podiatrist for further imaging of L heel as skin is not issue and pt reports previous trauma   Referral  placed  Continue to finish course of Prednisone as prescribed, counseled on possibility of rebound flare  Rec switching from tylenol to nsaid for antiinflammatory effects, kidneys ok  Telescoping punch biopsy performed in office today to confirm dx of EN from single remaining active indurated nodule on left leg  Will discuss with rheumatology when biopsy results return    What is erythema nodosum?  Erythema nodosum is a skin condition where red lumps form on the shins, and less commonly the thighs and forearms. It is a type of panniculitis, i.e. an inflammatory disorder affecting subcutaneous fat.    Who gets erythema nodosum?  Most cases of erythema nodosum occur between the ages of 20 and 45, with a peak from 20 to 30. It occurs less often in the elderly and in children. It is 3 to 6 times more common in adult women than in adult men. However the sex incidence before puberty is about equal.  Most people with erythema nodosum are otherwise in good health but it is often associated with recent infection or illness.    What are the causes of erythema nodosum?  Erythema nodosum appears to be a hypersensitivity reaction with a number of different causes.    Common causes of erythema nodosum are:  Throat infections; these may be due to streptococccus, or viral in origin.  Other bacterial infections, such as Mycoplasma pneumoniae.  Sarcoidosis; erythema nodosum is often associated with enlargement of the lymph nodes (bihilar lymphadenopathy) in the lungs in sarcoidosis. This is known as Löfgren syndrome. It may result in a dry cough or some shortness of breath.  Tuberculosis (TB); erythema nodosum occurs with the primary infection with TB.   Pregnancy or the oral contraceptive pill; erythema nodosum may occur after the first 2 or 3 cycles on the pill. EN may occur in pregnancy, clear after delivery, then recur in subsequent pregnancies.  Other drugs; other drugs which have been reported to cause erythema nodosum include:  "sulphonamides, saliclyates, other nonsteroidal anti-inflammatory drugs (NSAIDs), bromides, iodides and gold salts.  Inflammatory bowel disease (ulcerative colitis or Crohn disease)  Other causes; there are many other causes of erythema nodosum  Erythema nodosum leprosum is a particular variant of erythema nodosum that affects some people being treated for leprosy, and is more usually called type 2 lepra reaction.    Clinical presentation of erythema nodosum    Symptoms of underlying disease may be present in some patients with erythema nodosum, e.g. sore throat in those with streptococcal infection.    Red lumps appear on the shins or about the knees and ankle. They vary in size between a cherry and a grapefruit and in number from 2 to 50 or more. Usually there are 6-12 lumps on the front and sides of the legs and knees. The thighs, outer aspects of the arms, face and neck are less frequently involved and at these other sites the lesions are smaller and more superficial. The nodules are slightly raised above the surrounding skin; they are hot and painful, bright red when they first appear, later becoming purple then fading through the colour changes of a bruise.    Erythema nodosum nodules continue to erupt for about 10 days. The \"bruising\" colour-change starts in the second week, becomes most marked in the third week, then subsides at any time from the end of the third week to the sixth week. Aching of the legs and swelling of the ankles may persist for some weeks, especially if the patient does not rest up. New crops of erythema nodosum may occur over a number of weeks. Rarely, 2 or 3 large lesions merge to form a crescentic ring, which spreads for some days before fading.    Other symptoms may include:  Fever, general aching and feeling unwell (malaise) especially when the nodules first occur.  Joint aches or arthralgias occur in over half of those presenting with erythema nodosum, regardless of cause. The knee " jonts are almost always affected, the other large joints less commonly. Joint symptoms may persist for months afterwards but always resolve completely.  Conjunctivitis is less frequent.    How is erythema nodosum diagnosed?  The skin disorder is diagnosed clinically and a skin biopsy is often performed. The pathology of erythema nodosum shows inflammation around the septum between lobules of fat in subcutaneous tissue, without vasculitis.    Tests done in patients with erythema nodosum include:  Throat swab  Sputum or gastric washing if TB is suspected  Complete blood count and C-reactive protein (CRP) and/or erythrocyte sedimentation rate (ESR)  ASO titre (a test for streptococcal infection)  Chest X-ray (looking for TB and sarcoidosis)  Virus studies  Yersinia titres  Mantoux test or QuantiFERON gold (tests for TB)    Treatment of erythema nodosum  If an underlying infection is found, this should be treated.  Bed rest is advised if pain and/or swelling is severe.  Firm supportive bandages or light compression stockings should be worn.  Anti-inflammatory medications reduce discomfort  Potassium iodide has been reported to be effective but is not easy to obtain.  Oral tetracyclines have anti-inflammatory properties and may reduce discomfort and duration of disease  Mild cases of erythema nodosum subside in 3 to 6 weeks. Cropping of new lesions may occur within this time, especially if the patient is not resting. Sometimes, erythema nodosum may become a chronic or persistent disorder lasting for 6 months and occasionally for years.    PROCEDURE NOTE:  PUNCH BIOPSY      Performing Physician:     Anatomic Location; Clinical Description with size (cm); Pre-Op Diagnosis:    Specimen A: Left calf; tender subcutaneous nodule; Diff Dx: Erythema Nodosum         Anesthesia: 1% xylocaine with epi       Topical anesthesia: None       Indications: To indicate diagnosis and management plan.    Procedure Details     Patient  informed of the risks (including bleeding,scaring and infection) and benefits of the procedure explained. Verbal and written informed consent obtained. The area was prepped and draped in the usual fashion. Anesthesia was obtained with 1% lidocaine with epinephrine. The skin was then stretched perpendicular to the skin tension lines and a punch biopsy to an appropriate sampling depth was obtained with a 4 mm & 6 mm punch with a forceps and iris scissors.     Hemostasis was obtained with 4-0 Prolene x 2 sutures.     Complications:  None      Specimen has been sent for review by Dermatopathology.      Plan:  1. Instructed to keep the wound dry and covered for 24-48h and clean thereafter.  2. Warning signs of infection were reviewed.    3. Recommended that the patient use acetaminophen as needed for pain  4. Sutures if any should be removed in 10-14 days      Standard post-procedure care has been explained and has been included in written form within the patient's copy of Informed Consent.       Vonnie Bond MD- Dermatology PGY3    Scribe Attestation      I,:  Kylah Marquez MA am acting as a scribe while in the presence of the attending physician.:       I,:  Mela Trivedi MD personally performed the services described in this documentation    as scribed in my presence.:

## 2024-11-06 PROCEDURE — 88305 TISSUE EXAM BY PATHOLOGIST: CPT | Performed by: DERMATOLOGY

## 2024-11-06 PROCEDURE — 88312 SPECIAL STAINS GROUP 1: CPT | Performed by: DERMATOLOGY

## 2024-11-11 ENCOUNTER — CLINICAL SUPPORT (OUTPATIENT)
Dept: DERMATOLOGY | Facility: CLINIC | Age: 59
End: 2024-11-11

## 2024-11-11 DIAGNOSIS — Z48.02 ENCOUNTER FOR REMOVAL OF SUTURES: Primary | ICD-10-CM

## 2024-11-11 PROCEDURE — RECHECK

## 2024-11-11 NOTE — PROGRESS NOTES
"Suture removal    Date/Time: 11/11/2024 10:45 AM    Performed by: Johnna Cedeno RN  Authorized by: Maikel Luther MD  Universal Protocol:  procedure performed by consultantConsent: Verbal consent obtained. Written consent not obtained.  Risks and benefits: risks, benefits and alternatives were discussed  Consent given by: patient  Time out: Immediately prior to procedure a \"time out\" was called to verify the correct patient, procedure, equipment, support staff and site/side marked as required.  Timeout called at: 11/11/2024 10:54 AM.  Patient understanding: patient states understanding of the procedure being performed  Patient consent: the patient's understanding of the procedure matches consent given  Procedure consent: procedure consent matches procedure scheduled  Relevant documents: relevant documents not present or verified  Test results: test results not available  Site marked: the operative site was not marked  Radiology Images displayed and confirmed. If images not available, report reviewed: imaging studies not available  Patient identity confirmed: verbally with patient      Patient location:  Clinic  Location:     Laterality:  Left    Location:  Lower extremity    Lower extremity location: calf.  Procedure details:     Tools used:  Suture removal kit    Wound appearance:  Good wound healing, no sign(s) of infection, clean, moist and pink    Number of sutures removed:  2  Post-procedure details:     Post-removal:  Band-Aid applied (Vaseline applied)    Patient tolerance of procedure:  Tolerated well, no immediate complications      Before suture removal     After suture removal   "

## 2024-11-25 ENCOUNTER — TELEPHONE (OUTPATIENT)
Dept: DERMATOLOGY | Facility: CLINIC | Age: 59
End: 2024-11-25

## 2024-12-05 ENCOUNTER — OFFICE VISIT (OUTPATIENT)
Age: 59
End: 2024-12-05

## 2024-12-05 VITALS
OXYGEN SATURATION: 99 % | WEIGHT: 124 LBS | SYSTOLIC BLOOD PRESSURE: 142 MMHG | TEMPERATURE: 97 F | DIASTOLIC BLOOD PRESSURE: 86 MMHG | HEART RATE: 60 BPM | BODY MASS INDEX: 22.82 KG/M2 | HEIGHT: 62 IN

## 2024-12-05 DIAGNOSIS — L52 ERYTHEMA NODOSUM: Primary | ICD-10-CM

## 2024-12-05 RX ORDER — MELOXICAM 15 MG/1
15 TABLET ORAL DAILY
Qty: 30 TABLET | Refills: 2 | Status: SHIPPED | OUTPATIENT
Start: 2024-12-05

## 2024-12-05 NOTE — PROGRESS NOTES
Name: Emily Mejia      : 1965      MRN: [id number]  Encounter Provider: Janice Antoine MD  Encounter Date: 2024   Encounter department: [facility] [address]  :  Assessment & Plan  Erythema nodosum  Pt with erythema nodosum, biopsy proven, no evidence of underlying sarcoidosis or IBD at this point in time based on clinical symptoms, labs or imaging workup thus far. She did recently take a prednisone taper which was helpful for her pain and did help remove most of the nodules in her legs, however once the prednisone taper was completed she continued to have nodules and return of symptoms.  She has not tried taking scheduled NSAIDs as of yet. She denies any SOB, chest pain or other systemic symptoms at this time.  She has baseline visual impairment but denies any pain or redness in the eyes to suggest any kind of uveitis.  Plan:  Prescribed meloxicam 15 mg daily prn for pain management  No labs needed at this time  RTC 3 mo  If she does not have good improvement with NSAIDs we will consider other therapy options such as colchicine or Plaquenil in the future  Orders:    meloxicam (Mobic) 15 mg tablet; Take 1 tablet (15 mg total) by mouth daily        History of Present Illness     Rash  This is a recurrent problem. The current episode started more than 1 month ago. The problem has been resolved since onset. The rash is characterized by pain and swelling. She was exposed to nothing. Associated symptoms include joint pain. Pertinent negatives include no anorexia or facial edema.     Emily Mejia is a 59 y.o. female who presents for f/u of erythema nodosum.    Rheumatic disease summary  -Pt  initially presented in 2024 with bilateral LE nodules which are painful and skin discoloration, what looks like lupus pernio on her nose, etc, consistent with erythema nodosum, and possible underlying sarcoidosis.She has known hx of uveitis that led to blindness already.   -Pt underwent  "punch biopsy of left leg nodule in October which showed septal panniculitis with septal thickening and granuloma formation and neutrophil inflammation, supportive of a diagnosis of erythema nodosum.  -Labs showed normal ESR and CRP, as well as normal ACE and Vitamin D 1,25  -CXR did not show obvious hilar LAD  -She was given a prednisone taper which did help her symptoms, but then the nodules came back afterwards    No hx of GI issues, or prior IBD diagnosis.     She is not taking regular Nsaids.     She denies any SOB, chest discomfort, rashes, arthralgias or myalgias. She has baseline  visual impairment.           Review of Systems   Constitutional: Negative.    HENT: Negative.     Eyes:         Baseline blindness/visual impairment   Respiratory: Negative.     Cardiovascular: Negative.    Gastrointestinal: Negative.  Negative for anorexia.   Genitourinary: Negative.    Musculoskeletal:  Positive for joint pain.   Skin:  Positive for rash (erythema nodosum nodules in the legs bilaterally).          Objective   /86   Pulse 60   Temp (!) 97 °F (36.1 °C)   Ht 5' 2\" (1.575 m)   Wt 56.2 kg (124 lb)   SpO2 99%   BMI 22.68 kg/m²      Physical Exam  Constitutional:       Appearance: Normal appearance.   HENT:      Head: Normocephalic.   Cardiovascular:      Pulses: Normal pulses.      Heart sounds: Normal heart sounds.   Pulmonary:      Effort: Pulmonary effort is normal.      Breath sounds: Normal breath sounds.   Musculoskeletal:      Comments: MSK Exam  The following areas have been examined today and do not show any signs of synovitis, tenderness, or restricted ROM unless otherwise specified below:  Neck  Shoulders  Back  Elbows  Wrists  Hands  Hips  Knees  Ankles  Feet    Skin:     Findings: Lesion (erythema nodosum nodules in bilateral lower extremities, mostly around knee and anterior shin) present.   Neurological:      Mental Status: She is alert and oriented to person, place, and time. "           Answers submitted by the patient for this visit:  Rash Questionnaire (Submitted on 12/5/2024)  Chief Complaint: Rash  nail changes: No

## 2024-12-05 NOTE — ASSESSMENT & PLAN NOTE
Pt with erythema nodosum, biopsy proven, no evidence of underlying sarcoidosis or IBD at this point in time based on clinical symptoms, labs or imaging workup thus far. She did recently take a prednisone taper which was helpful for her pain and did help remove most of the nodules in her legs, however once the prednisone taper was completed she continued to have nodules and return of symptoms.  She has not tried taking scheduled NSAIDs as of yet. She denies any SOB, chest pain or other systemic symptoms at this time.  She has baseline visual impairment but denies any pain or redness in the eyes to suggest any kind of uveitis.  Plan:  Prescribed meloxicam 15 mg daily prn for pain management  No labs needed at this time  RTC 3 mo  If she does not have good improvement with NSAIDs we will consider other therapy options such as colchicine or Plaquenil in the future  Orders:    meloxicam (Mobic) 15 mg tablet; Take 1 tablet (15 mg total) by mouth daily

## 2025-03-12 ENCOUNTER — TELEPHONE (OUTPATIENT)
Dept: RHEUMATOLOGY | Facility: CLINIC | Age: 60
End: 2025-03-12

## 2025-05-02 ENCOUNTER — HOSPITAL ENCOUNTER (OUTPATIENT)
Dept: RADIOLOGY | Facility: HOSPITAL | Age: 60
End: 2025-05-02
Payer: MEDICARE

## 2025-05-02 ENCOUNTER — APPOINTMENT (OUTPATIENT)
Dept: LAB | Facility: HOSPITAL | Age: 60
End: 2025-05-02
Attending: OPHTHALMOLOGY
Payer: MEDICARE

## 2025-05-02 DIAGNOSIS — H20.012 PRIMARY IRIDOCYCLITIS OF LEFT EYE: ICD-10-CM

## 2025-05-02 PROCEDURE — 86780 TREPONEMA PALLIDUM: CPT

## 2025-05-02 PROCEDURE — 71046 X-RAY EXAM CHEST 2 VIEWS: CPT

## 2025-05-02 PROCEDURE — 36415 COLL VENOUS BLD VENIPUNCTURE: CPT

## 2025-05-02 PROCEDURE — 86592 SYPHILIS TEST NON-TREP QUAL: CPT

## 2025-05-02 PROCEDURE — 82164 ANGIOTENSIN I ENZYME TEST: CPT

## 2025-05-03 LAB — TREPONEMA PALLIDUM IGG+IGM AB [PRESENCE] IN SERUM OR PLASMA BY IMMUNOASSAY: REACTIVE

## 2025-05-05 LAB
ACE SERPL-CCNC: 58 U/L (ref 14–82)
RPR SER QL: NORMAL

## 2025-05-06 LAB
SL AMB TREPONEMA PALLIDUM ANTIBODIES: REACTIVE
T PALLIDUM AB SER QL AGGL: REACTIVE

## 2025-05-21 ENCOUNTER — TELEPHONE (OUTPATIENT)
Dept: INFECTIOUS DISEASES | Facility: CLINIC | Age: 60
End: 2025-05-21

## 2025-05-21 ENCOUNTER — APPOINTMENT (OUTPATIENT)
Dept: LAB | Facility: CLINIC | Age: 60
End: 2025-05-21
Payer: MEDICARE

## 2025-05-21 ENCOUNTER — OFFICE VISIT (OUTPATIENT)
Dept: FAMILY MEDICINE CLINIC | Facility: CLINIC | Age: 60
End: 2025-05-21
Payer: MEDICARE

## 2025-05-21 VITALS
BODY MASS INDEX: 30.55 KG/M2 | HEART RATE: 51 BPM | TEMPERATURE: 97.9 F | HEIGHT: 62 IN | WEIGHT: 166 LBS | DIASTOLIC BLOOD PRESSURE: 82 MMHG | OXYGEN SATURATION: 98 % | SYSTOLIC BLOOD PRESSURE: 148 MMHG

## 2025-05-21 DIAGNOSIS — Z13.29 THYROID DISORDER SCREENING: ICD-10-CM

## 2025-05-21 DIAGNOSIS — Z12.12 SCREENING FOR COLORECTAL CANCER: ICD-10-CM

## 2025-05-21 DIAGNOSIS — E78.2 MIXED HYPERLIPIDEMIA: ICD-10-CM

## 2025-05-21 DIAGNOSIS — Z11.3 SCREENING FOR STDS (SEXUALLY TRANSMITTED DISEASES): ICD-10-CM

## 2025-05-21 DIAGNOSIS — L52 ERYTHEMA NODOSUM: ICD-10-CM

## 2025-05-21 DIAGNOSIS — Z13.1 SCREENING FOR DIABETES MELLITUS: ICD-10-CM

## 2025-05-21 DIAGNOSIS — A53.9 SYPHILIS: Primary | ICD-10-CM

## 2025-05-21 DIAGNOSIS — Z00.00 MEDICARE ANNUAL WELLNESS VISIT, INITIAL: ICD-10-CM

## 2025-05-21 DIAGNOSIS — I10 PRIMARY HYPERTENSION: ICD-10-CM

## 2025-05-21 DIAGNOSIS — A52.3 NEUROSYPHILIS IN ADULT: ICD-10-CM

## 2025-05-21 DIAGNOSIS — Z11.59 NEED FOR HEPATITIS C SCREENING TEST: ICD-10-CM

## 2025-05-21 DIAGNOSIS — Z12.31 ENCOUNTER FOR SCREENING MAMMOGRAM FOR BREAST CANCER: ICD-10-CM

## 2025-05-21 DIAGNOSIS — Z11.4 SCREENING FOR HIV (HUMAN IMMUNODEFICIENCY VIRUS): ICD-10-CM

## 2025-05-21 DIAGNOSIS — H20.12 CHRONIC UVEITIS OF LEFT EYE: ICD-10-CM

## 2025-05-21 DIAGNOSIS — Z12.4 SCREENING FOR CERVICAL CANCER: ICD-10-CM

## 2025-05-21 DIAGNOSIS — Z12.11 SCREENING FOR COLORECTAL CANCER: ICD-10-CM

## 2025-05-21 LAB
ALBUMIN SERPL BCG-MCNC: 4.4 G/DL (ref 3.5–5)
ALP SERPL-CCNC: 88 U/L (ref 34–104)
ALT SERPL W P-5'-P-CCNC: 16 U/L (ref 7–52)
ANION GAP SERPL CALCULATED.3IONS-SCNC: 10 MMOL/L (ref 4–13)
AST SERPL W P-5'-P-CCNC: 21 U/L (ref 13–39)
BASOPHILS # BLD AUTO: 0.02 THOUSANDS/ÂΜL (ref 0–0.1)
BASOPHILS NFR BLD AUTO: 0 % (ref 0–1)
BILIRUB SERPL-MCNC: 0.4 MG/DL (ref 0.2–1)
BUN SERPL-MCNC: 16 MG/DL (ref 5–25)
CALCIUM SERPL-MCNC: 9.7 MG/DL (ref 8.4–10.2)
CHLORIDE SERPL-SCNC: 104 MMOL/L (ref 96–108)
CHOLEST SERPL-MCNC: 214 MG/DL (ref ?–200)
CO2 SERPL-SCNC: 25 MMOL/L (ref 21–32)
CREAT SERPL-MCNC: 0.66 MG/DL (ref 0.6–1.3)
EOSINOPHIL # BLD AUTO: 0.15 THOUSAND/ÂΜL (ref 0–0.61)
EOSINOPHIL NFR BLD AUTO: 2 % (ref 0–6)
ERYTHROCYTE [DISTWIDTH] IN BLOOD BY AUTOMATED COUNT: 15.1 % (ref 11.6–15.1)
EST. AVERAGE GLUCOSE BLD GHB EST-MCNC: 111 MG/DL
GFR SERPL CREATININE-BSD FRML MDRD: 96 ML/MIN/1.73SQ M
GLUCOSE P FAST SERPL-MCNC: 78 MG/DL (ref 65–99)
HBA1C MFR BLD: 5.5 %
HCT VFR BLD AUTO: 34.9 % (ref 34.8–46.1)
HDLC SERPL-MCNC: 78 MG/DL
HGB BLD-MCNC: 11.1 G/DL (ref 11.5–15.4)
IMM GRANULOCYTES # BLD AUTO: 0.03 THOUSAND/UL (ref 0–0.2)
IMM GRANULOCYTES NFR BLD AUTO: 0 % (ref 0–2)
LDLC SERPL CALC-MCNC: 120 MG/DL (ref 0–100)
LYMPHOCYTES # BLD AUTO: 4.34 THOUSANDS/ÂΜL (ref 0.6–4.47)
LYMPHOCYTES NFR BLD AUTO: 50 % (ref 14–44)
MCH RBC QN AUTO: 26.7 PG (ref 26.8–34.3)
MCHC RBC AUTO-ENTMCNC: 31.8 G/DL (ref 31.4–37.4)
MCV RBC AUTO: 84 FL (ref 82–98)
MONOCYTES # BLD AUTO: 0.56 THOUSAND/ÂΜL (ref 0.17–1.22)
MONOCYTES NFR BLD AUTO: 6 % (ref 4–12)
NEUTROPHILS # BLD AUTO: 3.66 THOUSANDS/ÂΜL (ref 1.85–7.62)
NEUTS SEG NFR BLD AUTO: 42 % (ref 43–75)
NONHDLC SERPL-MCNC: 136 MG/DL
NRBC BLD AUTO-RTO: 0 /100 WBCS
PLATELET # BLD AUTO: 265 THOUSANDS/UL (ref 149–390)
PMV BLD AUTO: 10.9 FL (ref 8.9–12.7)
POTASSIUM SERPL-SCNC: 3.7 MMOL/L (ref 3.5–5.3)
PROT SERPL-MCNC: 9.3 G/DL (ref 6.4–8.4)
RBC # BLD AUTO: 4.16 MILLION/UL (ref 3.81–5.12)
SODIUM SERPL-SCNC: 139 MMOL/L (ref 135–147)
TRIGL SERPL-MCNC: 82 MG/DL (ref ?–150)
TSH SERPL DL<=0.05 MIU/L-ACNC: 2.09 UIU/ML (ref 0.45–4.5)
WBC # BLD AUTO: 8.76 THOUSAND/UL (ref 4.31–10.16)

## 2025-05-21 PROCEDURE — 87389 HIV-1 AG W/HIV-1&-2 AB AG IA: CPT

## 2025-05-21 PROCEDURE — 87591 N.GONORRHOEAE DNA AMP PROB: CPT

## 2025-05-21 PROCEDURE — 87491 CHLMYD TRACH DNA AMP PROBE: CPT

## 2025-05-21 PROCEDURE — 85025 COMPLETE CBC W/AUTO DIFF WBC: CPT

## 2025-05-21 PROCEDURE — 80061 LIPID PANEL: CPT

## 2025-05-21 PROCEDURE — G0438 PPPS, INITIAL VISIT: HCPCS

## 2025-05-21 PROCEDURE — 99204 OFFICE O/P NEW MOD 45 MIN: CPT

## 2025-05-21 PROCEDURE — 83036 HEMOGLOBIN GLYCOSYLATED A1C: CPT

## 2025-05-21 PROCEDURE — 36415 COLL VENOUS BLD VENIPUNCTURE: CPT

## 2025-05-21 PROCEDURE — 84443 ASSAY THYROID STIM HORMONE: CPT

## 2025-05-21 PROCEDURE — 86803 HEPATITIS C AB TEST: CPT

## 2025-05-21 PROCEDURE — 80053 COMPREHEN METABOLIC PANEL: CPT

## 2025-05-21 RX ORDER — MELOXICAM 15 MG/1
15 TABLET ORAL DAILY
Qty: 30 TABLET | Refills: 2 | Status: SHIPPED | OUTPATIENT
Start: 2025-05-21

## 2025-05-21 RX ORDER — PREDNISOLONE ACETATE 10 MG/ML
SUSPENSION/ DROPS OPHTHALMIC
COMMUNITY
Start: 2025-04-11

## 2025-05-21 RX ORDER — DORZOLAMIDE HYDROCHLORIDE AND TIMOLOL MALEATE 20; 5 MG/ML; MG/ML
SOLUTION/ DROPS OPHTHALMIC
COMMUNITY
Start: 2025-03-17

## 2025-05-21 NOTE — PATIENT INSTRUCTIONS
Medicare Preventive Visit Patient Instructions  Thank you for completing your Welcome to Medicare Visit or Medicare Annual Wellness Visit today. Your next wellness visit will be due in one year (5/22/2026).  The screening/preventive services that you may require over the next 5-10 years are detailed below. Some tests may not apply to you based off risk factors and/or age. Screening tests ordered at today's visit but not completed yet may show as past due. Also, please note that scanned in results may not display below.  Preventive Screenings:  Service Recommendations Previous Testing/Comments   Colorectal Cancer Screening  * Colonoscopy    * Fecal Occult Blood Test (FOBT)/Fecal Immunochemical Test (FIT)  * Fecal DNA/Cologuard Test  * Flexible Sigmoidoscopy Age: 45-75 years old   Colonoscopy: every 10 years (may be performed more frequently if at higher risk)  OR  FOBT/FIT: every 1 year  OR  Cologuard: every 3 years  OR  Sigmoidoscopy: every 5 years  Screening may be recommended earlier than age 45 if at higher risk for colorectal cancer. Also, an individualized decision between you and your healthcare provider will decide whether screening between the ages of 76-85 would be appropriate. Colonoscopy: Not on file  FOBT/FIT: Not on file  Cologuard: Not on file  Sigmoidoscopy: Not on file          Breast Cancer Screening Age: 40+ years old  Frequency: every 1-2 years  Not required if history of left and right mastectomy Mammogram: Not on file        Cervical Cancer Screening Between the ages of 21-29, pap smear recommended once every 3 years.   Between the ages of 30-65, can perform pap smear with HPV co-testing every 5 years.   Recommendations may differ for women with a history of total hysterectomy, cervical cancer, or abnormal pap smears in past. Pap Smear: Not on file        Hepatitis C Screening Once for adults born between 1945 and 1965  More frequently in patients at high risk for Hepatitis C Hep C Antibody: Not  on file        Diabetes Screening 1-2 times per year if you're at risk for diabetes or have pre-diabetes Fasting glucose: No results in last 5 years (No results in last 5 years)  A1C: No results in last 5 years (No results in last 5 years)  Screening Current   Cholesterol Screening Once every 5 years if you don't have a lipid disorder. May order more often based on risk factors. Lipid panel: Not on file          Other Preventive Screenings Covered by Medicare:  Abdominal Aortic Aneurysm (AAA) Screening: covered once if your at risk. You're considered to be at risk if you have a family history of AAA.  Lung Cancer Screening: covers low dose CT scan once per year if you meet all of the following conditions: (1) Age 55-77; (2) No signs or symptoms of lung cancer; (3) Current smoker or have quit smoking within the last 15 years; (4) You have a tobacco smoking history of at least 20 pack years (packs per day multiplied by number of years you smoked); (5) You get a written order from a healthcare provider.  Glaucoma Screening: covered annually if you're considered high risk: (1) You have diabetes OR (2) Family history of glaucoma OR (3)  aged 50 and older OR (4)  American aged 65 and older  Osteoporosis Screening: covered every 2 years if you meet one of the following conditions: (1) You're estrogen deficient and at risk for osteoporosis based off medical history and other findings; (2) Have a vertebral abnormality; (3) On glucocorticoid therapy for more than 3 months; (4) Have primary hyperparathyroidism; (5) On osteoporosis medications and need to assess response to drug therapy.   Last bone density test (DXA Scan): Not on file.  HIV Screening: covered annually if you're between the age of 15-65. Also covered annually if you are younger than 15 and older than 65 with risk factors for HIV infection. For pregnant patients, it is covered up to 3 times per pregnancy.    Immunizations:  Immunization  Recommendations   Influenza Vaccine Annual influenza vaccination during flu season is recommended for all persons aged >= 6 months who do not have contraindications   Pneumococcal Vaccine   * Pneumococcal conjugate vaccine = PCV13 (Prevnar 13), PCV15 (Vaxneuvance), PCV20 (Prevnar 20)  * Pneumococcal polysaccharide vaccine = PPSV23 (Pneumovax) Adults 19-63 yo with certain risk factors or if 65+ yo  If never received any pneumonia vaccine: recommend Prevnar 20 (PCV20)  Give PCV20 if previously received 1 dose of PCV13 or PPSV23   Hepatitis B Vaccine 3 dose series if at intermediate or high risk (ex: diabetes, end stage renal disease, liver disease)   Respiratory syncytial virus (RSV) Vaccine - COVERED BY MEDICARE PART D  * RSVPreF3 (Arexvy) CDC recommends that adults 60 years of age and older may receive a single dose of RSV vaccine using shared clinical decision-making (SCDM)   Tetanus (Td) Vaccine - COST NOT COVERED BY MEDICARE PART B Following completion of primary series, a booster dose should be given every 10 years to maintain immunity against tetanus. Td may also be given as tetanus wound prophylaxis.   Tdap Vaccine - COST NOT COVERED BY MEDICARE PART B Recommended at least once for all adults. For pregnant patients, recommended with each pregnancy.   Shingles Vaccine (Shingrix) - COST NOT COVERED BY MEDICARE PART B  2 shot series recommended in those 19 years and older who have or will have weakened immune systems or those 50 years and older     Health Maintenance Due:      Topic Date Due   • Hepatitis C Screening  Never done   • HIV Screening  Never done   • Cervical Cancer Screening  Never done   • Breast Cancer Screening: Mammogram  Never done   • Colorectal Cancer Screening  Never done     Immunizations Due:      Topic Date Due   • COVID-19 Vaccine (1 - 2024-25 season) Never done     Advance Directives   What are advance directives?  Advance directives are legal documents that state your wishes and plans  for medical care. These plans are made ahead of time in case you lose your ability to make decisions for yourself. Advance directives can apply to any medical decision, such as the treatments you want, and if you want to donate organs.   What are the types of advance directives?  There are many types of advance directives, and each state has rules about how to use them. You may choose a combination of any of the following:  Living will:  This is a written record of the treatment you want. You can also choose which treatments you do not want, which to limit, and which to stop at a certain time. This includes surgery, medicine, IV fluid, and tube feedings.   Durable power of  for healthcare (DPAHC):  This is a written record that states who you want to make healthcare choices for you when you are unable to make them for yourself. This person, called a proxy, is usually a family member or a friend. You may choose more than 1 proxy.  Do not resuscitate (DNR) order:  A DNR order is used in case your heart stops beating or you stop breathing. It is a request not to have certain forms of treatment, such as CPR. A DNR order may be included in other types of advance directives.  Medical directive:  This covers the care that you want if you are in a coma, near death, or unable to make decisions for yourself. You can list the treatments you want for each condition. Treatment may include pain medicine, surgery, blood transfusions, dialysis, IV or tube feedings, and a ventilator (breathing machine).  Values history:  This document has questions about your views, beliefs, and how you feel and think about life. This information can help others choose the care that you would choose.  Why are advance directives important?  An advance directive helps you control your care. Although spoken wishes may be used, it is better to have your wishes written down. Spoken wishes can be misunderstood, or not followed. Treatments may be  given even if you do not want them. An advance directive may make it easier for your family to make difficult choices about your care.   Depression   Depression  is a medical condition that causes feelings of sadness or hopelessness that do not go away. Depression may cause you to lose interest in things you used to enjoy. These feelings may interfere with your daily life.  Call your local emergency number (911 in the ) if:   You think about harming yourself or someone else.  You have done something on purpose to hurt yourself.  The following resources are available at any time to help you, if needed:   National Suicide Prevention Lifeline: 1-347.775.7131 (8-905-023-TALK)   Suicide Hotline: 1-519.703.7922 (0-761-ZHGQPXI)   For a list of international numbers: https://save.org/find-help/international-resources/  Treatment for depression may include medicine to relieve depression. Medicine is often used together with therapy. Therapy is a way for you to talk about your feelings and anything that may be causing depression. Therapy can be done alone or in a group. It may also be done with family members or a significant other.  Get regular physical activity.    Create a regular sleep schedule.    Eat a variety of healthy foods.    Do not drink alcohol or use drugs.     Weight Management   Why it is important to manage your weight:  Being overweight increases your risk of health conditions such as heart disease, high blood pressure, type 2 diabetes, and certain types of cancer. It can also increase your risk for osteoarthritis, sleep apnea, and other respiratory problems. Aim for a slow, steady weight loss. Even a small amount of weight loss can lower your risk of health problems.  How to lose weight safely:  A safe and healthy way to lose weight is to eat fewer calories and get regular exercise. You can lose up about 1 pound a week by decreasing the number of calories you eat by 500 calories each day.   Healthy meal  plan for weight management:  A healthy meal plan includes a variety of foods, contains fewer calories, and helps you stay healthy. A healthy meal plan includes the following:  Eat whole-grain foods more often.  A healthy meal plan should contain fiber. Fiber is the part of grains, fruits, and vegetables that is not broken down by your body. Whole-grain foods are healthy and provide extra fiber in your diet. Some examples of whole-grain foods are whole-wheat breads and pastas, oatmeal, brown rice, and bulgur.  Eat a variety of vegetables every day.  Include dark, leafy greens such as spinach, kale, marc greens, and mustard greens. Eat yellow and orange vegetables such as carrots, sweet potatoes, and winter squash.   Eat a variety of fruits every day.  Choose fresh or canned fruit (canned in its own juice or light syrup) instead of juice. Fruit juice has very little or no fiber.  Eat low-fat dairy foods.  Drink fat-free (skim) milk or 1% milk. Eat fat-free yogurt and low-fat cottage cheese. Try low-fat cheeses such as mozzarella and other reduced-fat cheeses.  Choose meat and other protein foods that are low in fat.  Choose beans or other legumes such as split peas or lentils. Choose fish, skinless poultry (chicken or turkey), or lean cuts of red meat (beef or pork). Before you cook meat or poultry, cut off any visible fat.   Use less fat and oil.  Try baking foods instead of frying them. Add less fat, such as margarine, sour cream, regular salad dressing and mayonnaise to foods. Eat fewer high-fat foods. Some examples of high-fat foods include french fries, doughnuts, ice cream, and cakes.  Eat fewer sweets.  Limit foods and drinks that are high in sugar. This includes candy, cookies, regular soda, and sweetened drinks.  Exercise:  Exercise at least 30 minutes per day on most days of the week. Some examples of exercise include walking, biking, dancing, and swimming. You can also fit in more physical activity by  taking the stairs instead of the elevator or parking farther away from stores. Ask your healthcare provider about the best exercise plan for you.    © Copyright [employer] 2018 Information is for End User's use only and may not be sold, redistributed or otherwise used for commercial purposes. All illustrations and images included in CareNotes® are the copyrighted property of [employer] or [employer]

## 2025-05-21 NOTE — PROGRESS NOTES
Name: Emily Mejia      : 1965      MRN: [id number]  Encounter Provider: Sarah Hull PA-C  Encounter Date: 2025   Encounter department: [facility]  :  Assessment & Plan  Syphilis  Patient recently went to [facility] for evaluation of left eye blurriness and discomfort. 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 RPR was checked on  which was positive. [facility] recommended she get treatment for neurosyphilis with IV PCN x 14 days. They did offer to admit her to their facilities however pt wanted to see if she could be treated locally instead    I messaged our infectious disease department, Dr. Reece regarding these findings. She advised this is likely non emergent given chronic nature. She advised me to place referral to outpatient infectious disease, and their office would be in touch to schedule the patient for a consult. I did advise pt if she can not get in with our infectious diease department within the week that I'd recommend calling [facility] and facilitating treatment through them which she is agreeable with.     Orders:    Ambulatory Referral to Infectious Disease; Future    Screening for colorectal cancer    Orders:    Ambulatory Referral to Gastroenterology; Future    Encounter for screening mammogram for breast cancer    Orders:    Mammo screening bilateral w 3d and cad; Future    Screening for cervical cancer    Orders:    Ambulatory referral to Obstetrics / Gynecology; Future    Screening for HIV (human immunodeficiency virus)    Orders:    HIV 1/2 AB/AG w Reflex [facility] for 2 yr old and above; Future    Need for hepatitis C screening test    Orders:    Hepatitis C antibody; Future    Screening for STDs (sexually transmitted diseases)  Given recent positive RPR, will check G/C, HIV/Hep C as well     Orders:    Chlamydia/GC amplified DNA by PCR; Future    HIV 1/2 AB/AG w Reflex [facility] for 2 yr old and  above; Future    Hepatitis C antibody; Future    Thyroid disorder screening    Orders:    TSH, 3rd generation with Free T4 reflex; Future    Screening for diabetes mellitus    Orders:    Comprehensive metabolic panel; Future    Hemoglobin A1C; Future    Mixed hyperlipidemia  Reports hx of HLD. Not on any medication currently.   Will check lipid panel.   Discussed dietary recommendation in depth.     Orders:    Lipid panel; Future    Chronic uveitis of left eye  Following with [facility].     Orders:    Ambulatory Referral to Infectious Disease; Future    Neurosyphilis in adult  See plan above.   Defer to infectious disease for management.     Orders:    Ambulatory Referral to Infectious Disease; Future    Medicare annual wellness visit, initial  Presents to establish care and medicare wellness visit  Physical exam unremarkable  Ordered routine labs  Referral to GI placed for colon cancer screening  Ordered mammogram for breast cancer screening  Placed referral to GYN for cervical cancer screening       Primary hypertension  Patient reports hx of HTN. Previously not on medication, but was in the past.   BP today 148/82 mmHg which is not controlled.   I advised her to start checking her BP at home daily and keep a log. Goal of < 140/90 mmHg. I would like her to follow up in the office in 2-4 weeks to review home log. If consisently above goal, would recommend starting on antihypertensive agent.   Check labs before next visit.     Orders:    Comprehensive metabolic panel; Future    CBC and differential; Future    Erythema nodosum  Follows with  rheumatology and dermatology  Takes meloxicam PRN          Depression Screening and Follow-up Plan: Patient's depression screening was positive with a PHQ-2 score of 5. Their PHQ-9 score was 13.   Clincally patient does not have depression. No treatment is required. Pt reports feeling down from recent syphilis dx. Does not feel depression. No SI.       Preventive health issues  were discussed with patient, and age appropriate screening tests were ordered as noted in patient's After Visit Summary. Personalized health advice and appropriate referrals for health education or preventive services given if needed, as noted in patient's After Visit Summary.    History of Present Illness     CC: medicare wellness visit, establish care    Patient presents to establish care and for medicare wellness visit  She is new to the area from [place]   She reports hx of HLD and HTN. Not currently on medication for either.   She was recently found to have erythema nodosum, follows with  rheumatology and dermatology for management.     Recently she went to [facility] for evaluation. 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 RPR was checked on 5/2 which was positive. [facility] recommended she get treatment for neurosyphilis with IV PCN x 14 days. They did offer to admit her to their facilities however pt wanted to see if she could be treated locally instead  Pt does not know where she may have gotten the infection. Reports she has not been sexually active for 5+ years. She denies any genital lesions that she can remember. She is very upset about this and has been making her feel down.        Patient Care Team:  Sarah Hull PA-C as PCP - General (Family Medicine)    Review of Systems   Eyes:  Positive for visual disturbance.   Respiratory:  Negative for chest tightness, shortness of breath and wheezing.    Cardiovascular:  Negative for chest pain and palpitations.   Neurological:  Negative for dizziness, light-headedness and headaches.     Medical History Reviewed by provider this encounter:       Annual Wellness Visit Questionnaire   Emily is here for her Initial Wellness visit.     Health Risk Assessment:   Patient rates overall health as good. Patient feels that their physical health rating is same. Patient is satisfied with their life. Eyesight  was rated as same. Hearing was rated as same. Patient feels that their emotional and mental health rating is slightly worse. Patients states they are never, rarely angry. Patient states they are sometimes unusually tired/fatigued. Pain experienced in the last 7 days has been none. Patient states that she has experienced no weight loss or gain in last 6 months.     Depression Screening:   PHQ-2 Score: 5  PHQ-9 Score: 13      Fall Risk Screening:   In the past year, patient has experienced: history of falling in past year    Number of falls: 1  Injured during fall?: No    Feels unsteady when standing or walking?: No    Worried about falling?: No      Urinary Incontinence Screening:   Patient has not leaked urine accidently in the last six months.     Home Safety:  Patient does not have trouble with stairs inside or outside of their home. Patient has working smoke alarms and has working carbon monoxide detector. Home safety hazards include: none.     Nutrition:   Current diet is Regular.     Medications:   Patient is currently taking over-the-counter supplements. OTC medications include: see medication list. Patient is not able to manage medications.     Activities of Daily Living (ADLs)/Instrumental Activities of Daily Living (IADLs):   Walk and transfer into and out of bed and chair?: Yes  Dress and groom yourself?: Yes    Bathe or shower yourself?: Yes    Feed yourself? Yes  Do your laundry/housekeeping?: No  Manage your money, pay your bills and track your expenses?: No  Make your own meals?: No    Do your own shopping?: No    Previous Hospitalizations:   Any hospitalizations or ED visits within the last 12 months?: No      Advance Care Planning:   Living will: Yes    Durable POA for healthcare: Yes    Advanced directive: Yes    Advanced directive counseling given: Yes      Cognitive Screening:   Provider or family/friend/caregiver concerned regarding cognition?: No    Preventive Screenings      Cardiovascular  Screening:    General: History Lipid Disorder and Risks and Benefits Discussed    Due for: Lipid Panel      Diabetes Screening:     General: Risks and Benefits Discussed    Due for: Blood Glucose      Colorectal Cancer Screening:     General: Risks and Benefits Discussed    Due for: Colonoscopy - Low Risk      Breast Cancer Screening:     General: Risks and Benefits Discussed    Due for: Mammogram        Cervical Cancer Screening:    General: Screening Not Indicated      Osteoporosis Screening:    General: Screening Not Indicated      Abdominal Aortic Aneurysm (AAA) Screening:        General: Screening Not Indicated      Lung Cancer Screening:     General: Screening Not Indicated      Hepatitis C Screening:    General: Risks and Benefits Discussed    Hep C Screening Accepted: Yes      Screening, Brief Intervention, and Referral to Treatment (SBIRT)     Screening  Typical number of drinks in a day: 0  Typical number of drinks in a week: 4  Interpretation: Low risk drinking behavior.    Single Item Drug Screening:  How often have you used an illegal drug (including marijuana) or a prescription medication for non-medical reasons in the past year? never    Single Item Drug Screen Score: 0  Interpretation: Negative screen for possible drug use disorder    Social Drivers of Health     Food Insecurity: No Food Insecurity (5/21/2025)    Nursing - Inadequate Food Risk Classification     Worried About Running Out of Food in the Last Year: Never true     Ran Out of Food in the Last Year: Never true   Transportation Needs: No Transportation Needs (5/21/2025)    PRAPARE - Transportation     Lack of Transportation (Medical): No     Lack of Transportation (Non-Medical): No   Housing Stability: Low Risk  (5/21/2025)    Housing Stability Vital Sign     Unable to Pay for Housing in the Last Year: No     Number of Times Moved in the Last Year: 1     Homeless in the Last Year: No   Utilities: Not At Risk (5/21/2025)    [facility] Utilities     " Threatened with loss of utilities: No     No results found.    Objective   /82   Pulse (!) 51   Temp 97.9 °F (36.6 °C)   Ht 5' 2\" (1.575 m)   Wt 75.3 kg (166 lb)   SpO2 98%   BMI 30.36 kg/m²     Physical Exam  Vitals reviewed.   Constitutional:       General: She is not in acute distress.     Appearance: Normal appearance. She is not ill-appearing or diaphoretic.   HENT:      Head: Normocephalic and atraumatic.      Right Ear: Tympanic membrane, ear canal and external ear normal. There is no impacted cerumen.      Left Ear: Tympanic membrane, ear canal and external ear normal. There is no impacted cerumen.      Nose: Nose normal.      Mouth/Throat:      Mouth: Mucous membranes are moist.      Pharynx: Oropharynx is clear.     Cardiovascular:      Rate and Rhythm: Normal rate and regular rhythm.      Heart sounds: Normal heart sounds. No murmur heard.  Pulmonary:      Effort: Pulmonary effort is normal. No respiratory distress.      Breath sounds: Normal breath sounds. No wheezing, rhonchi or rales.   Abdominal:      General: There is no distension.      Palpations: Abdomen is soft.      Tenderness: There is no abdominal tenderness.     Musculoskeletal:      Cervical back: Neck supple.      Right lower leg: No edema.      Left lower leg: No edema.   Lymphadenopathy:      Cervical: No cervical adenopathy.     Skin:     General: Skin is warm.     Neurological:      General: No focal deficit present.      Mental Status: She is alert.      Gait: Gait normal.     Psychiatric:         Mood and Affect: Mood normal.         "

## 2025-05-21 NOTE — TELEPHONE ENCOUNTER
I called Kaycee back and offered her an appt this Friday, May 23rd with Dr. Siegel in [place]. We strongly recommended to come in then. She said he  has to drive [employer] in and he could not get the day off of work. She then scheduled the appt for 5/29 at 9:00 with  Dr. Siegel in the [place] office. I let her know to call the office with any questions or concerns.

## 2025-05-21 NOTE — ASSESSMENT & PLAN NOTE
Reports hx of HLD. Not on any medication currently.   Will check lipid panel.   Discussed dietary recommendation in depth.     Orders:    Lipid panel; Future

## 2025-05-21 NOTE — TELEPHONE ENCOUNTER
I attempted to call Catarino to schedule her for a consult with Dr. Siegel 5/23 at 8:30 in [place] if she can make that appt.  She was still at another Dr. Phillips and her daughter in law said she will call the office back when they leave there.

## 2025-05-21 NOTE — ASSESSMENT & PLAN NOTE
Patient recently went to [facility] for evaluation of left eye blurriness and discomfort. 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 RPR was checked on 5/2 which was positive. [facility] recommended she get treatment for neurosyphilis with IV PCN x 14 days. They did offer to admit her to their facilities however pt wanted to see if she could be treated locally instead    I messaged our infectious disease department, Dr. Reece regarding these findings. She advised this is likely non emergent given chronic nature. She advised me to place referral to outpatient infectious disease, and their office would be in touch to schedule the patient for a consult. I did advise pt if she can not get in with our infectious diease department within the week that I'd recommend calling [facility] and facilitating treatment through them which she is agreeable with.     Orders:    Ambulatory Referral to Infectious Disease; Future

## 2025-05-21 NOTE — TELEPHONE ENCOUNTER
Kaycee returned call to schedule consult. She stated she was trying to get in touch with her  who would drive patient. I placed her on hold to coordinate with office staff on available appointment times when I returned the line was silent. I disconnected after attempts to speak with her.

## 2025-05-22 ENCOUNTER — TELEPHONE (OUTPATIENT)
Dept: FAMILY MEDICINE CLINIC | Facility: CLINIC | Age: 60
End: 2025-05-22

## 2025-05-22 ENCOUNTER — RESULTS FOLLOW-UP (OUTPATIENT)
Dept: FAMILY MEDICINE CLINIC | Facility: CLINIC | Age: 60
End: 2025-05-22

## 2025-05-22 DIAGNOSIS — E78.2 MIXED HYPERLIPIDEMIA: Primary | ICD-10-CM

## 2025-05-22 LAB
C TRACH DNA SPEC QL NAA+PROBE: NEGATIVE
HCV AB SER QL: NORMAL
HIV 1+2 AB+HIV1 P24 AG SERPL QL IA: NORMAL
N GONORRHOEA DNA SPEC QL NAA+PROBE: NEGATIVE

## 2025-05-22 RX ORDER — ROSUVASTATIN CALCIUM 10 MG/1
10 TABLET, COATED ORAL DAILY
Qty: 100 TABLET | Refills: 3 | Status: SHIPPED | OUTPATIENT
Start: 2025-05-22

## 2025-05-29 ENCOUNTER — HOSPITAL ENCOUNTER (INPATIENT)
Facility: HOSPITAL | Age: 60
LOS: 5 days | Discharge: HOME HEALTH | DRG: 057 | End: 2025-06-03
Attending: EMERGENCY MEDICINE | Admitting: INTERNAL MEDICINE
Payer: MEDICARE

## 2025-05-29 ENCOUNTER — CONSULT (OUTPATIENT)
Age: 60
End: 2025-05-29
Payer: MEDICARE

## 2025-05-29 ENCOUNTER — APPOINTMENT (INPATIENT)
Dept: INTERVENTIONAL RADIOLOGY/VASCULAR | Facility: HOSPITAL | Age: 60
DRG: 057 | End: 2025-05-29
Attending: INTERNAL MEDICINE
Payer: MEDICARE

## 2025-05-29 VITALS
TEMPERATURE: 97.5 F | HEART RATE: 66 BPM | WEIGHT: 166 LBS | BODY MASS INDEX: 29.41 KG/M2 | SYSTOLIC BLOOD PRESSURE: 134 MMHG | DIASTOLIC BLOOD PRESSURE: 76 MMHG | OXYGEN SATURATION: 99 % | HEIGHT: 63 IN

## 2025-05-29 DIAGNOSIS — A52.3 NEUROSYPHILIS: Primary | ICD-10-CM

## 2025-05-29 DIAGNOSIS — L52 ERYTHEMA NODOSUM: ICD-10-CM

## 2025-05-29 DIAGNOSIS — H20.9 UVEITIS: ICD-10-CM

## 2025-05-29 DIAGNOSIS — R03.0 ELEVATED BLOOD PRESSURE READING: ICD-10-CM

## 2025-05-29 PROBLEM — D64.9 CHRONIC ANEMIA: Status: ACTIVE | Noted: 2025-05-29

## 2025-05-29 PROBLEM — E78.5 HYPERLIPIDEMIA: Status: ACTIVE | Noted: 2025-05-29

## 2025-05-29 PROBLEM — Z87.2 HISTORY OF ERYTHEMA NODOSUM: Status: ACTIVE | Noted: 2025-05-29

## 2025-05-29 PROBLEM — H20.10 CHRONIC UVEITIS: Status: ACTIVE | Noted: 2025-05-29

## 2025-05-29 LAB
ALBUMIN SERPL BCG-MCNC: 4.1 G/DL (ref 3.5–5)
ALP SERPL-CCNC: 72 U/L (ref 34–104)
ALT SERPL W P-5'-P-CCNC: 11 U/L (ref 7–52)
ANION GAP SERPL CALCULATED.3IONS-SCNC: 9 MMOL/L (ref 4–13)
APPEARANCE CSF: ABNORMAL
AST SERPL W P-5'-P-CCNC: 16 U/L (ref 13–39)
BASOPHILS # BLD AUTO: 0.02 THOUSANDS/ÂΜL (ref 0–0.1)
BASOPHILS # BLD AUTO: 0.02 THOUSANDS/ÂΜL (ref 0–0.1)
BASOPHILS NFR BLD AUTO: 0 % (ref 0–1)
BASOPHILS NFR BLD AUTO: 0 % (ref 0–1)
BILIRUB SERPL-MCNC: 0.25 MG/DL (ref 0.2–1)
BUN SERPL-MCNC: 29 MG/DL (ref 5–25)
CALCIUM SERPL-MCNC: 9.5 MG/DL (ref 8.4–10.2)
CHLORIDE SERPL-SCNC: 106 MMOL/L (ref 96–108)
CO2 SERPL-SCNC: 25 MMOL/L (ref 21–32)
CREAT SERPL-MCNC: 0.76 MG/DL (ref 0.6–1.3)
EOSINOPHIL # BLD AUTO: 0.08 THOUSAND/ÂΜL (ref 0–0.61)
EOSINOPHIL # BLD AUTO: 0.08 THOUSAND/ÂΜL (ref 0–0.61)
EOSINOPHIL NFR BLD AUTO: 1 % (ref 0–6)
EOSINOPHIL NFR BLD AUTO: 1 % (ref 0–6)
ERYTHROCYTE [DISTWIDTH] IN BLOOD BY AUTOMATED COUNT: 14.6 % (ref 11.6–15.1)
ERYTHROCYTE [DISTWIDTH] IN BLOOD BY AUTOMATED COUNT: 14.6 % (ref 11.6–15.1)
GFR SERPL CREATININE-BSD FRML MDRD: 85 ML/MIN/1.73SQ M
GLUCOSE CSF-MCNC: 58 MG/DL (ref 40–70)
GLUCOSE SERPL-MCNC: 93 MG/DL (ref 65–140)
GRAM STN SPEC: NORMAL
HCT VFR BLD AUTO: 32.5 % (ref 34.8–46.1)
HCT VFR BLD AUTO: 33.2 % (ref 34.8–46.1)
HGB BLD-MCNC: 10.3 G/DL (ref 11.5–15.4)
HGB BLD-MCNC: 10.5 G/DL (ref 11.5–15.4)
IMM GRANULOCYTES # BLD AUTO: 0.02 THOUSAND/UL (ref 0–0.2)
IMM GRANULOCYTES # BLD AUTO: 0.02 THOUSAND/UL (ref 0–0.2)
IMM GRANULOCYTES NFR BLD AUTO: 0 % (ref 0–2)
IMM GRANULOCYTES NFR BLD AUTO: 0 % (ref 0–2)
INR PPP: 0.94 (ref 0.85–1.19)
LYMPHOCYTES # BLD AUTO: 3.47 THOUSANDS/ÂΜL (ref 0.6–4.47)
LYMPHOCYTES # BLD AUTO: 3.69 THOUSANDS/ÂΜL (ref 0.6–4.47)
LYMPHOCYTES NFR BLD AUTO: 38 % (ref 14–44)
LYMPHOCYTES NFR BLD AUTO: 39 % (ref 14–44)
LYMPHOCYTES NFR CSF MANUAL: 63 %
MAGNESIUM SERPL-MCNC: 1.5 MG/DL (ref 1.9–2.7)
MCH RBC QN AUTO: 26.7 PG (ref 26.8–34.3)
MCH RBC QN AUTO: 26.8 PG (ref 26.8–34.3)
MCHC RBC AUTO-ENTMCNC: 31.6 G/DL (ref 31.4–37.4)
MCHC RBC AUTO-ENTMCNC: 31.7 G/DL (ref 31.4–37.4)
MCV RBC AUTO: 84 FL (ref 82–98)
MCV RBC AUTO: 85 FL (ref 82–98)
MONOCYTES # BLD AUTO: 0.54 THOUSAND/ÂΜL (ref 0.17–1.22)
MONOCYTES # BLD AUTO: 0.59 THOUSAND/ÂΜL (ref 0.17–1.22)
MONOCYTES NFR BLD AUTO: 6 % (ref 4–12)
MONOCYTES NFR BLD AUTO: 6 % (ref 4–12)
MONOS+MACROS CSF MANUAL: 7 %
NEUTROPHILS # BLD AUTO: 4.92 THOUSANDS/ÂΜL (ref 1.85–7.62)
NEUTROPHILS # BLD AUTO: 4.97 THOUSANDS/ÂΜL (ref 1.85–7.62)
NEUTROPHILS NFR CSF MANUAL: 30 %
NEUTS SEG NFR BLD AUTO: 54 % (ref 43–75)
NEUTS SEG NFR BLD AUTO: 55 % (ref 43–75)
NRBC BLD AUTO-RTO: 0 /100 WBCS
NRBC BLD AUTO-RTO: 0 /100 WBCS
PLATELET # BLD AUTO: 249 THOUSANDS/UL (ref 149–390)
PLATELET # BLD AUTO: 254 THOUSANDS/UL (ref 149–390)
PMV BLD AUTO: 9.5 FL (ref 8.9–12.7)
PMV BLD AUTO: 9.6 FL (ref 8.9–12.7)
POTASSIUM SERPL-SCNC: 4.1 MMOL/L (ref 3.5–5.3)
PROT CSF-MCNC: 54 MG/DL (ref 15–45)
PROT SERPL-MCNC: 8.4 G/DL (ref 6.4–8.4)
PROTHROMBIN TIME: 13.1 SECONDS (ref 12.3–15)
RBC # BLD AUTO: 3.85 MILLION/UL (ref 3.81–5.12)
RBC # BLD AUTO: 3.93 MILLION/UL (ref 3.81–5.12)
SODIUM SERPL-SCNC: 140 MMOL/L (ref 135–147)
TOTAL CELLS COUNTED BLD: NO
TOTAL CELLS COUNTED SPEC: 30
TUBE # CSF: 4
WBC # BLD AUTO: 9.05 THOUSAND/UL (ref 4.31–10.16)
WBC # BLD AUTO: 9.37 THOUSAND/UL (ref 4.31–10.16)
WBC # CSF AUTO: 7 /UL (ref 0–5)

## 2025-05-29 PROCEDURE — 36415 COLL VENOUS BLD VENIPUNCTURE: CPT | Performed by: EMERGENCY MEDICINE

## 2025-05-29 PROCEDURE — B01BZZZ FLUOROSCOPY OF SPINAL CORD: ICD-10-PCS | Performed by: RADIOLOGY

## 2025-05-29 PROCEDURE — 99204 OFFICE O/P NEW MOD 45 MIN: CPT | Performed by: INTERNAL MEDICINE

## 2025-05-29 PROCEDURE — 82945 GLUCOSE OTHER FLUID: CPT | Performed by: INTERNAL MEDICINE

## 2025-05-29 PROCEDURE — 89051 BODY FLUID CELL COUNT: CPT | Performed by: INTERNAL MEDICINE

## 2025-05-29 PROCEDURE — 62328 DX LMBR SPI PNXR W/FLUOR/CT: CPT

## 2025-05-29 PROCEDURE — 99284 EMERGENCY DEPT VISIT MOD MDM: CPT | Performed by: EMERGENCY MEDICINE

## 2025-05-29 PROCEDURE — 85610 PROTHROMBIN TIME: CPT | Performed by: INTERNAL MEDICINE

## 2025-05-29 PROCEDURE — 86592 SYPHILIS TEST NON-TREP QUAL: CPT | Performed by: INTERNAL MEDICINE

## 2025-05-29 PROCEDURE — 85025 COMPLETE CBC W/AUTO DIFF WBC: CPT | Performed by: EMERGENCY MEDICINE

## 2025-05-29 PROCEDURE — 02HV33Z INSERTION OF INFUSION DEVICE INTO SUPERIOR VENA CAVA, PERCUTANEOUS APPROACH: ICD-10-PCS | Performed by: RADIOLOGY

## 2025-05-29 PROCEDURE — 009U3ZX DRAINAGE OF SPINAL CANAL, PERCUTANEOUS APPROACH, DIAGNOSTIC: ICD-10-PCS | Performed by: RADIOLOGY

## 2025-05-29 PROCEDURE — 80053 COMPREHEN METABOLIC PANEL: CPT | Performed by: EMERGENCY MEDICINE

## 2025-05-29 PROCEDURE — 83735 ASSAY OF MAGNESIUM: CPT | Performed by: EMERGENCY MEDICINE

## 2025-05-29 PROCEDURE — 87070 CULTURE OTHR SPECIMN AEROBIC: CPT | Performed by: INTERNAL MEDICINE

## 2025-05-29 PROCEDURE — 62328 DX LMBR SPI PNXR W/FLUOR/CT: CPT | Performed by: RADIOLOGY

## 2025-05-29 PROCEDURE — NC001 PR NO CHARGE: Performed by: RADIOLOGY

## 2025-05-29 PROCEDURE — 99223 1ST HOSP IP/OBS HIGH 75: CPT | Performed by: INTERNAL MEDICINE

## 2025-05-29 PROCEDURE — 84157 ASSAY OF PROTEIN OTHER: CPT | Performed by: INTERNAL MEDICINE

## 2025-05-29 PROCEDURE — 85025 COMPLETE CBC W/AUTO DIFF WBC: CPT | Performed by: INTERNAL MEDICINE

## 2025-05-29 PROCEDURE — 99284 EMERGENCY DEPT VISIT MOD MDM: CPT

## 2025-05-29 RX ORDER — HYDRALAZINE HYDROCHLORIDE 20 MG/ML
5 INJECTION INTRAMUSCULAR; INTRAVENOUS EVERY 6 HOURS PRN
Status: DISCONTINUED | OUTPATIENT
Start: 2025-05-29 | End: 2025-05-29

## 2025-05-29 RX ORDER — LIDOCAINE HYDROCHLORIDE 10 MG/ML
INJECTION, SOLUTION EPIDURAL; INFILTRATION; INTRACAUDAL; PERINEURAL AS NEEDED
Status: COMPLETED | OUTPATIENT
Start: 2025-05-29 | End: 2025-05-29

## 2025-05-29 RX ORDER — ACETAMINOPHEN 325 MG/1
650 TABLET ORAL EVERY 6 HOURS PRN
Status: DISCONTINUED | OUTPATIENT
Start: 2025-05-29 | End: 2025-06-03 | Stop reason: HOSPADM

## 2025-05-29 RX ORDER — MULTIVITAMIN
1 TABLET ORAL DAILY
COMMUNITY

## 2025-05-29 RX ORDER — ONDANSETRON 2 MG/ML
4 INJECTION INTRAMUSCULAR; INTRAVENOUS EVERY 4 HOURS PRN
Status: DISCONTINUED | OUTPATIENT
Start: 2025-05-29 | End: 2025-06-03 | Stop reason: HOSPADM

## 2025-05-29 RX ORDER — PRAVASTATIN SODIUM 40 MG
80 TABLET ORAL
Status: DISCONTINUED | OUTPATIENT
Start: 2025-05-29 | End: 2025-06-03 | Stop reason: HOSPADM

## 2025-05-29 RX ORDER — HYDRALAZINE HYDROCHLORIDE 20 MG/ML
10 INJECTION INTRAMUSCULAR; INTRAVENOUS EVERY 6 HOURS PRN
Status: DISCONTINUED | OUTPATIENT
Start: 2025-05-29 | End: 2025-06-03 | Stop reason: HOSPADM

## 2025-05-29 RX ADMIN — SODIUM CHLORIDE 4 MILLION UNITS: 9 INJECTION, SOLUTION INTRAVENOUS at 21:53

## 2025-05-29 RX ADMIN — HYDRALAZINE HYDROCHLORIDE 5 MG: 20 INJECTION INTRAMUSCULAR; INTRAVENOUS at 17:56

## 2025-05-29 RX ADMIN — SODIUM CHLORIDE 4 MILLION UNITS: 9 INJECTION, SOLUTION INTRAVENOUS at 16:45

## 2025-05-29 RX ADMIN — SODIUM CHLORIDE 4 MILLION UNITS: 9 INJECTION, SOLUTION INTRAVENOUS at 11:28

## 2025-05-29 RX ADMIN — PRAVASTATIN SODIUM 80 MG: 40 TABLET ORAL at 16:45

## 2025-05-29 RX ADMIN — LIDOCAINE HYDROCHLORIDE 10 ML: 10 INJECTION, SOLUTION EPIDURAL; INFILTRATION; INTRACAUDAL; PERINEURAL at 13:52

## 2025-05-29 RX ADMIN — HYDRALAZINE HYDROCHLORIDE 5 MG: 20 INJECTION INTRAMUSCULAR; INTRAVENOUS at 12:11

## 2025-05-29 RX ADMIN — HYDRALAZINE HYDROCHLORIDE 10 MG: 20 INJECTION INTRAMUSCULAR; INTRAVENOUS at 22:02

## 2025-05-29 NOTE — QUICK NOTE
Notified by [facility] on patient arrival to the ED and pending admission. Patient was seen in the outpatient infectious disease office this morning as a new consult after a positive syphilis screening by her ophthalmologist. Patient with progressing B/L uveitis followed for many years at [facility]. She recently started following with a new ophthalmologist who was concerned for neurosyphilis. Screening came back positive. She was recommended for admission in [place] but declined. She was referred to outpatient ID for care. This morning she had initial ID assessment and was instructed to get to the hospital right away for LP work up and admission.    Upon arrival to the ED the patient was clinically stable without fever or leukocytosis. Did not meet any sepsis criteria. LP was ordered, but prior to going for test she was started on IV PCN. I did contact bedside RN to stop the infusion as quickly as possible as it will impact the accuracy of the LP. RN discontinued dose but reported most of bag had already infused. Patient planned for LP shortly and CSF studies, including VDRL, have been ordered.    ID recommends initiating IV PCN, 4 million units every 4 hours, after LP has been completed. Anticipate a 14-day treatment course. Formal ID consult to follow.

## 2025-05-29 NOTE — PROCEDURES
Interventional Radiology Procedure Note    PATIENT NAME: Emily Mejia  : 1965  MRN: [id number]     Pre-op Diagnosis:   1. Neurosyphilis      2.    Disconjugate gaze    Post-op Diagnosis:   1. Neurosyphilis      2.   Same    Procedure: Lumbar puncture    Surgeon:   George Arrieta MD  Assistants:     No qualified resident was available, Resident is only observing    Estimated Blood Loss: Minimal     Findings: Bloody tap.  Needle was in the epidural vein anteriorly.  It was repositioned.  CSF pressure is quite low.  I had to use elongated tubing, to get the collection vials lower than the spine.  I used a siphon affect to get CSF.  Because I had to use such long tubing, the initial red blood cells were drug through a very long length of tubing.  As a result, there will be red blood cells in every tube.  They will not clear.  However, I feel confident that this is a traumatic tap, and not hemorrhage.    Specimens: CSF sent in for tubes    Complications: Bloody tap.    Anesthesia: local    George Arrieta MD     Date: 2025  Time: 2:09 PM

## 2025-05-29 NOTE — DISCHARGE INSTRUCTIONS
Lumbar Puncture     WHAT YOU NEED TO KNOW:   Lumbar puncture (LP) is a procedure in which a needle is inserted in your back and into your spinal canal. This is usually done to collect cerebrospinal fluid (CSF) to check for an infection, inflammation, bleeding, or other conditions that affect the brain. CSF is a clear, protective fluid that flows around the brain and inside the spinal canal. LP may also be done to remove CSF to reduce pressure in the brain.     DISCHARGE INSTRUCTIONS:     Follow up with your healthcare provider as directed: Write down your questions so you remember to ask them during your visits.     Post-lumbar puncture headache: You may develop a headache during the first few hours after your LP that may last for several days. The headache may be mild to severe and may get worse when you sit or stand. The following may help ease a post-lumbar puncture headache:  Drink plenty of liquids: You should drink more liquid than usual after your LP. Ask how much liquid is right for you. Caffeine may be used to treat a headache. Drinks, such as coffee, tea, or some sodas, have caffeine. Ask a Do not drink alcohol.    Lie down: If you have a headache after your lumbar puncture, it may be helpful to lie down and rest.  You may have a slight soreness over the LP area. This is normal.  Remove the band aid or dressing in 24 hours.    Contact Interventional Radiology imediately  at [phone] ([place] PATIENTS: Contact Interventional Radiology at [phone]) ([place] PATIENTS: Contact Interventional Radiology at [phone]) if any of the following occur:  You have a severe headache that does not get better after you lie down.  Persistent nausea or vomiting   You have a fever.   You have a stiff neck or have trouble thinking clearly.   Your legs, feet, or other parts below the waist feel numb, tingly, or weak.   You have bleeding or a discharge coming from the area where the needle was put into your back.   You  have severe pain in your back or neck.

## 2025-05-29 NOTE — PLAN OF CARE
Problem: PAIN - ADULT  Goal: Verbalizes/displays adequate comfort level or baseline comfort level  Description: Interventions:  - Encourage patient to monitor pain and request assistance  - Assess pain using appropriate pain scale  - Administer analgesics as ordered based on type and severity of pain and evaluate response  - Implement non-pharmacological measures as appropriate and evaluate response  - Consider cultural and social influences on pain and pain management  - Notify physician/advanced practitioner if interventions unsuccessful or patient reports new pain  - Educate patient/family on pain management process including their role and importance of  reporting pain   - Provide non-pharmacologic/complimentary pain relief interventions  Outcome: Progressing     Problem: SAFETY ADULT  Goal: Maintain or return to baseline ADL function  Description: INTERVENTIONS:  -  Assess patient's ability to carry out ADLs; assess patient's baseline for ADL function and identify physical deficits which impact ability to perform ADLs (bathing, care of mouth/teeth, toileting, grooming, dressing, etc.)  - Assess/evaluate cause of self-care deficits   - Assess range of motion  - Assess patient's mobility; develop plan if impaired  - Assess patient's need for assistive devices and provide as appropriate  - Encourage maximum independence but intervene and supervise when necessary  - Involve family in performance of ADLs  - Assess for home care needs following discharge   - Consider OT consult to assist with ADL evaluation and planning for discharge  - Provide patient education as appropriate  - Monitor functional capacity and physical performance, use of AM PAC & JH-HLM   - Monitor gait, balance and fatigue with ambulation    Outcome: Progressing     Problem: Knowledge Deficit  Goal: Patient/family/caregiver demonstrates understanding of disease process, treatment plan, medications, and discharge instructions  Description:  Complete learning assessment and assess knowledge base.  Interventions:  - Provide teaching at level of understanding  - Provide teaching via preferred learning methods  Outcome: Progressing     Problem: DISCHARGE PLANNING  Goal: Discharge to home or other facility with appropriate resources  Description: INTERVENTIONS:  - Identify barriers to discharge w/patient and caregiver  - Arrange for needed discharge resources and transportation as appropriate  - Identify discharge learning needs (meds, wound care, etc.)  - Arrange for interpretive services to assist at discharge as needed  - Refer to Case Management Department for coordinating discharge planning if the patient needs post-hospital services based on physician/advanced practitioner order or complex needs related to functional status, cognitive ability, or social support system  Outcome: Progressing

## 2025-05-29 NOTE — ASSESSMENT & PLAN NOTE
Seen by outpatient Infectious disease, with recommendation to be hospitalized to initiate intravenous antibiotic for neurosyphilis, after lumbar puncture for CSF fluid confirmation (CSF VDRL and infectious panel ordered)  Of note, patient was administered a dose of IV penicillin in the ED prior to lumbar puncture - post-procedure, we will plan to continue IV PCN 4,000,000 units Q4H x 14 days per ID recommendation  Supportive care otherwise

## 2025-05-29 NOTE — ED PROVIDER NOTES
Time reflects when diagnosis was documented in both MDM as applicable and the Disposition within this note       Time User Action Codes Description Comment    5/29/2025 10:49 AM Priscilla Venegas Add [A52.3] Neurosyphilis           ED Disposition       None          Assessment & Plan       Medical Decision Making  60-year-old female presents with complaint of abnormal outpatient labs, left-sided blurred vision for the last 1 to 2 years.  Patient saw infectious disease, had positive antitreponemal antibodies, suspicious for neurosyphilis/ocular syphilis.  ID note appreciated, recommend starting IV penicillin every 4 hour, LP which can be performed by interventional radiology.      Patient examined, no new complaints, no pain    Will start IV penicillin, admit    Amount and/or Complexity of Data Reviewed  External Data Reviewed: notes.  Labs: ordered. Decision-making details documented in ED Course.             Medications   penicillin G (PFIZERPEN-G) 4 Million Units in sodium chloride 0.9 % 50 mL IVPB (has no administration in time range)   penicillin G (PFIZERPEN-G) 4 Million Units in sodium chloride 0.9 % 50 mL IVPB (has no administration in time range)   acetaminophen (TYLENOL) tablet 650 mg (has no administration in time range)   ondansetron (ZOFRAN) injection 4 mg (has no administration in time range)   hydrALAZINE (APRESOLINE) injection 5 mg (has no administration in time range)       ED Risk Strat Scores                    No data recorded                            History of Present Illness       Chief Complaint   Patient presents with    Medical Problem     Sent to the ed by PCP for admission and treatment of neuro syphilis        Past Medical History[1]   Past Surgical History[2]   Family History[3]   Social History[4]   E-Cigarette/Vaping    E-Cigarette Use Never User       E-Cigarette/Vaping Substances      I have reviewed and agree with the history as documented.     Patient with positive syphilis test as  an outpatient, worsening left-sided eye blurriness.  Chronic right eye blindness.  Symptoms concerning for neurosyphilis, was seen by infectious disease who recommended admission      Medical Problem      Review of Systems        Objective       ED Triage Vitals [05/29/25 1028]   Temperature Pulse Blood Pressure Respirations SpO2 Patient Position - Orthostatic VS   97.6 °F (36.4 °C) 74 (!) 174/77 16 99 % Sitting      Temp Source Heart Rate Source BP Location FiO2 (%) Pain Score    Temporal Monitor Left arm -- No Pain      Vitals      Date and Time Temp Pulse SpO2 Resp BP Pain Score FACES Pain Rating User   05/29/25 1028 97.6 °F (36.4 °C) 74 99 % 16 174/77 No Pain -- NAD            Physical Exam  Vitals and nursing note reviewed.   Constitutional:       Appearance: Normal appearance. She is well-developed.   HENT:      Head: Normocephalic and atraumatic.     Eyes:      Conjunctiva/sclera: Conjunctivae normal.      Pupils: Pupils are equal, round, and reactive to light.      Comments: Blind in right eye  Left eye exam grossly normal   Neck:      Trachea: No tracheal deviation.     Cardiovascular:      Rate and Rhythm: Normal rate and regular rhythm.      Heart sounds: Normal heart sounds. No murmur heard.  Pulmonary:      Effort: Pulmonary effort is normal. No respiratory distress.      Breath sounds: Normal breath sounds. No wheezing or rales.   Abdominal:      General: Bowel sounds are normal. There is no distension.      Palpations: Abdomen is soft.      Tenderness: There is no abdominal tenderness.     Musculoskeletal:         General: No deformity.      Cervical back: Normal range of motion and neck supple.     Skin:     General: Skin is warm and dry.      Capillary Refill: Capillary refill takes less than 2 seconds.     Neurological:      General: No focal deficit present.      Mental Status: She is alert and oriented to person, place, and time.      Sensory: No sensory deficit.     Psychiatric:         Mood and  Affect: Mood normal.         Judgment: Judgment normal.         Results Reviewed       Procedure Component Value Units Date/Time    CBC and differential [[id number]]  (Abnormal) Collected: 05/29/25 1038    Lab Status: Final result Specimen: Blood from Arm, Right Updated: 05/29/25 1042     WBC 9.37 Thousand/uL      RBC 3.93 Million/uL      Hemoglobin 10.5 g/dL      Hematocrit 33.2 %      MCV 85 fL      MCH 26.7 pg      MCHC 31.6 g/dL      RDW 14.6 %      MPV 9.5 fL      Platelets 254 Thousands/uL      nRBC 0 /100 WBCs      Segmented % 54 %      Immature Grans % 0 %      Lymphocytes % 39 %      Monocytes % 6 %      Eosinophils Relative 1 %      Basophils Relative 0 %      Absolute Neutrophils 4.97 Thousands/µL      Absolute Immature Grans 0.02 Thousand/uL      Absolute Lymphocytes 3.69 Thousands/µL      Absolute Monocytes 0.59 Thousand/µL      Eosinophils Absolute 0.08 Thousand/µL      Basophils Absolute 0.02 Thousands/µL     Comprehensive metabolic panel [[id number]] Collected: 05/29/25 1038    Lab Status: In process Specimen: Blood from Arm, Right Updated: 05/29/25 1040    Magnesium [[id number]] Collected: 05/29/25 1038    Lab Status: In process Specimen: Blood from Arm, Right Updated: 05/29/25 1040            No orders to display       Procedures    ED Medication and Procedure Management   Prior to Admission Medications   Prescriptions Last Dose Informant Patient Reported? Taking?   Atropine Sulfate 0.01 % SOLN   Yes No   Sig: in the morning and at noon and in the evening and before bedtime. Instill 1 drop 4x a day into the eye.   DORZOLAMIDE HCL OP   Yes No   Sig: in the morning and at noon and in the evening and before bedtime. Instill 1 drop 4x a day into the left eye.   dorzolamide-timolol (COSOPT) 2-0.5 % ophthalmic solution   Yes No   Patient not taking: Reported on 5/29/2025   meloxicam (MOBIC) 15 mg tablet   No No   Sig: TAKE 1 TABLET (15 MG TOTAL) BY MOUTH DAILY.   prednisoLONE acetate (PRED FORTE) 1 %  ophthalmic suspension   Yes No   Sig: if needed   rosuvastatin (CRESTOR) 10 MG tablet   No No   Sig: Take 1 tablet (10 mg total) by mouth daily      Facility-Administered Medications: None     Patient's Medications   Discharge Prescriptions    No medications on file     No discharge procedures on file.  ED SEPSIS DOCUMENTATION   Time reflects when diagnosis was documented in both MDM as applicable and the Disposition within this note       Time User Action Codes Description Comment    5/29/2025 10:49 AM Priscilla Venegas [A52.3] Neurosyphilis                    [1]   Past Medical History:  Diagnosis Date    Hypertension     Syphilis 5/15/25   [2]   Past Surgical History:  Procedure Laterality Date    HAND SURGERY  2000   [3] No family history on file.  [4]   Social History  Tobacco Use    Smoking status: Never    Smokeless tobacco: Never   Vaping Use    Vaping status: Never Used   Substance Use Topics    Alcohol use: Yes     Alcohol/week: 1.0 standard drink of alcohol     Types: 1 Shots of liquor per week     Comment: socially    Drug use: Yes     Frequency: 5.0 times per week     Types: Marijuana        Danish Jacques,   05/29/25 4023

## 2025-05-29 NOTE — PROGRESS NOTES
Name: Emily Mejia      : 1965      MRN: [id number]  Encounter Provider: Jeff Siegel MD  Encounter Date: 2025   Encounter department: [facility] [place]  :  Assessment & Plan  Neurosyphilis  Patient has evidence of syphilis on syphilis screen, with treponema screen antibody positive, RPR negative and confirmatory second treponema antibody also positive.  Patient denies prior history of syphilis.  She denies prior treatment of syphilis.  Result of syphilis screen is consistent with prior untreated syphilis.  Given presence of uveitis of unclear etiology (see below), neurosyphilis is highly possible.  Patient should get lumbar puncture to assess for level of CSF inflammation.  Regardless of CSF findings, patient should still receive complete treatment course for neurosyphilis.  If CSF has significant admission, she may need repeat lumbar puncture to confirm resolution of CSF inflammation.  Patient should get observation in inpatient setting with initiation of treatment of neurosyphilis, in the event he develops Jarisch-Herxheimer like reaction.  If she tolerates treatment well, the rest of treatment course can be done as outpatient.  Of note, patient had negative HIV screen.  Recommend lumbar puncture to assess for level of CSF inflammation and CSF VDRL.  Recommend neurosyphilis treatment with IV PCN 4 million units every 4 hours dosing for 14 days.  Monitor for adverse reaction with treatment.  Patient may need repeat lumbar puncture in a few months if there is significant CSF inflammation on initial lumbar puncture.  Uveitis  Patient has progressive bilateral eye uveitis for last 2 years, with unclear etiology.  Her right eye now is nearly blind.  With positive serum screen above, we need to consider neurosyphilis as etiology of uveitis.  Also, as above, even if CSF is benign, patient should receive treatment for neurosyphilis regardless.  Treatment for neurosyphilis  as above.  Continue ophthalmology follow-up.  Erythema nodosum  Patient developed erythema nodosum in September of last year.  It is unclear whether this is related to supers above.  It would be interesting to observe patient to see whether she has recurrent erythema nodosum after treatment of syphilis.  Monitor for recurrent erythema nodosum after syphilis treatment.    Outpatient records reviewed in detail.  Discussed with patient and her son, who accompanies her, in detail regarding the above plan.  Patient will go to [facility] for evaluation and admission for treatment neurosyphilis later today.  We will see patient as inpatient to coordinate treatment of neurosyphilis.    History of Present Illness   Reason for Consult: Neurosyphilis.  HPI: Emily Mejia is a 60 y.o. year old female, relatively healthy, has progressed in bilateral eye uveitis for the last many years, worsening right eye.  She has been followed by ophthalmologist at [facility].  Her longtime ophthalmologist recently retired.  No new ophthalmologist took over.  Due to concern for neurosyphilis, syphilis screen was done.  This came back positive.  Patient was offered admission in [place] for IV PCN.  Patient declined.  She is referred to us for evaluation.    As seen above, patient states she has uveitis for many years, much worse in right eye.  Her right eye is almost completely blind now.  Left eye still has some vision.  She denies any headache.  She denies any focal weakness.  She denies any unsteadiness.  She denies dizziness.    Interestingly, in September of this year, patient developed multiple subcutaneous nodules in her legs and arms.  She was referred to dermatology.  Biopsy was consistent with erythema nodosum.  This resolved with systemic corticosteroid.  Rheumatology workup was negative.    Patient is currently not sexually active.  Her  passed away in 2002.    ROS: A complete review of systems are  "negative other than that noted in the HPI        Antibiotics: None    Objective   /76 (BP Location: Right arm, Patient Position: Sitting, Cuff Size: Standard)   Pulse 66   Temp 97.5 °F (36.4 °C) (Temporal)   Ht 5' 3\" (1.6 m)   Wt 75.3 kg (166 lb)   SpO2 99%   BMI 29.41 kg/m²      General: Alert, interactive, appearing well, nontoxic, no acute distress.  Eye: Very little vision in right eye.  Throat: Oropharynx moist without lesions.   Lungs: Clear to auscultation bilaterally; no audible wheezes, rhonchi or rales; respirations unlabored  Heart: RRR; no murmur, rub or gallop  Abdomen: Soft, non-tender, non-distended, positive bowel sounds.    Extremities: No clubbing, cyanosis or edema  Skin: No new rashes or lesions. No new draining wounds.    Lab Results: I have personally reviewed pertinent labs.  Lab Results   Component Value Date    K 3.7 05/21/2025     05/21/2025    CO2 25 05/21/2025    BUN 16 05/21/2025    CREATININE 0.66 05/21/2025    GLUF 78 05/21/2025    CALCIUM 9.7 05/21/2025    AST 21 05/21/2025    ALT 16 05/21/2025    ALKPHOS 88 05/21/2025    EGFR 96 05/21/2025     Lab Results   Component Value Date    WBC 8.76 05/21/2025    HGB 11.1 (L) 05/21/2025    HCT 34.9 05/21/2025    MCV 84 05/21/2025     05/21/2025     Lab Results   Component Value Date     (H) 10/01/2024               "

## 2025-05-29 NOTE — ASSESSMENT & PLAN NOTE
No prior history of hypertension, per patient  PRN IV Hydralazine on board  If BPs remain persistently elevated, need to initiate standing oral antihypertensive agent

## 2025-05-29 NOTE — H&P
"H&P - Hospitalist   Name: Emily Mejia 60 y.o. female I MRN: [id number]  Unit/Bed#: -01 I Date of Admission: 5/29/2025   Date of Service: 5/29/2025 I Hospital Day: 0     Assessment & Plan  Neurosyphilis  Seen by outpatient Infectious disease, with recommendation to be hospitalized to initiate intravenous antibiotic for neurosyphilis, after lumbar puncture for CSF fluid confirmation (CSF VDRL and infectious panel ordered)  Of note, patient was administered a dose of IV penicillin in the ED prior to lumbar puncture - post-procedure, we will plan to continue IV PCN 4,000,000 units Q4H x 14 days per ID recommendation  Supportive care otherwise  Chronic uveitis  Ongoing issue of bilateral eyes over the last few years, although patient reports \"eye issues\" for a few decades now - follows [facility] in [place], [place]  Evaluated by outpatient ophthalmology, after recently moving into the area, with positive syphilis screening  See plan for neurosyphilis above  Hyperlipidemia  Continue statin  Chronic anemia  H/H stable  Elevated blood pressures  No prior history of hypertension, per patient  PRN IV Hydralazine on board  If BPs remain persistently elevated, need to initiate standing oral antihypertensive agent  History of erythema nodosum  Monitor for recurrent with initiated neurosyphilis treatment  Outpatient follow-up      VTE Pharmacologic Prophylaxis: VTE Score: 3 Moderate Risk (Score 3-4) - Pharmacological DVT Prophylaxis Contraindicated. Sequential Compression Devices Ordered.    Code Status: Level 1 - Full Code    Discussion with:  Patient at bedside    Anticipated Length of Stay:  Patient will be admitted on an Inpatient basis with an anticipated length of stay of greater than 2 midnights.   Justification for Hospital Stay: Progressive visual deficits with recent diagnosis of neurosyphilis requiring intravenous antibiotics and lumbar puncture for CSF analysis.     Total Time for Visit (including " Counseling & Coordination of Care):  75 minutes. More than 50% of total time spent on counseling and coordination of care, on one or more of the following: performing physical exam; counseling and coordination of care; obtaining or reviewing history; documenting in the medical record; reviewing/ordering tests, medications or procedures; communicating with other healthcare professionals and discussing with patient's family/caregivers.      History of Present Illness    Chief Complaint:  Visual complications with recent neurosyphilis diagnosis    Emily Mejia is a 60 y.o. female who presents with after being seen by outpatient ID for recently diagnosed neurosyphilis by ophthalmology.  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  She follows [facility] in [place], [place], and due to moving recently to this area, was prompted to see ID due to her recent diagnosis of neurosyphilis.  Subsequently she was told to report to the hospital today after her outpatient ID appointment to initiate intravenous penicillin for treatment, after undergoing a lumbar puncture for CSF fluid confirmation.  At the time of my encounter after arrival to the medical floor, she endorses of some mild anxiety but otherwise remains in hopeful spirits.  Denies any fever/chills, headaches, or other constitutional symptoms at this time.    Review of Systems:  A thorough 12 point review systems was conducted.  Pertinent positives and negatives are mentioned in the history of present illness.      Past Medical & Surgical History    Past Medical History[1]    Past Surgical History[2]      Medications:   Prior to Admission medications    Medication Sig Start Date End Date Taking? Authorizing Provider   DORZOLAMIDE HCL OP in the morning and at noon and  in the evening and before bedtime. Instill 1 drop 4x a day into the left eye. 10/7/00  Yes Historical Provider, MD   dorzolamide-timolol (COSOPT) 2-0.5 % ophthalmic solution Administer 1 drop into the left eye 2 (two) times a day 3/17/25  Yes Historical Provider, MD   meloxicam (MOBIC) 15 mg tablet TAKE 1 TABLET (15 MG TOTAL) BY MOUTH DAILY. 5/21/25  Yes Janice Antoine MD   Multiple Vitamin (multivitamin) tablet Take 1 tablet by mouth daily   Yes Historical Provider, MD   rosuvastatin (CRESTOR) 10 MG tablet Take 1 tablet (10 mg total) by mouth daily 5/22/25  Yes Sarah Hull PA-C   Atropine Sulfate 0.01 % SOLN in the morning and at noon and in the evening and before bedtime. Instill 1 drop 4x a day into the eye.  Patient not taking: Reported on 5/29/2025 10/7/00 5/29/25  Historical Provider, MD   prednisoLONE acetate (PRED FORTE) 1 % ophthalmic suspension if needed  Patient not taking: Reported on 5/29/2025 4/11/25 5/29/25  Historical Provider, MD       Allergies: Allergies[3]      Social & Family History    Social History     Substance and Sexual Activity   Alcohol Use Yes    Alcohol/week: 1.0 standard drink of alcohol    Types: 1 Shots of liquor per week    Comment: socially     Tobacco Use History[4]  Social History     Substance and Sexual Activity   Drug Use Yes    Frequency: 5.0 times per week    Types: Marijuana       Family History[5]      Objective     Vitals:   Blood Pressure: (!) 190/68 (05/29/25 1755)  Pulse: 69 (05/29/25 1755)  Temperature: 98.2 °F (36.8 °C) (05/29/25 1548)  Temp Source: Oral (05/29/25 1420)  Respirations: 17 (05/29/25 1500)  Weight - Scale: 75.4 kg (166 lb 3.6 oz) (05/29/25 1122)  SpO2: 99 % (05/29/25 1755)      Physical Exam:    GENERAL No immediate distress at rest   HEAD   Normocephalic - atraumatic   EYES Chronic visual deficits present   MOUTH   Mucosa moist   NECK   Supple - full range of motion   CARDIAC Rate controlled   PULMONARY Fairly clear to auscultation without  labored respirations at rest   ABDOMEN Nontender/nondistended   MUSCULOSKELETAL   Motor strength/range of motion intact   NEUROLOGIC   Alert/oriented at baseline   PSYCHIATRIC   Mood/affect stable         Labs & Recent Cultures:  Results from last 7 days   Lab Units 05/29/25  1106   WBC Thousand/uL 9.05   HEMOGLOBIN g/dL 10.3*   HEMATOCRIT % 32.5*   PLATELETS Thousands/uL 249   SEGS PCT % 55   LYMPHO PCT % 38   MONO PCT % 6   EOS PCT % 1     Results from last 7 days   Lab Units 05/29/25  1038   SODIUM mmol/L 140   POTASSIUM mmol/L 4.1   CHLORIDE mmol/L 106   CO2 mmol/L 25   BUN mg/dL 29*   CREATININE mg/dL 0.76   ANION GAP mmol/L 9   CALCIUM mg/dL 9.5   ALBUMIN g/dL 4.1   TOTAL BILIRUBIN mg/dL 0.25   ALK PHOS U/L 72   ALT U/L 11   AST U/L 16   GLUCOSE RANDOM mg/dL 93     Results from last 7 days   Lab Units 05/29/25  1106   INR  0.94                     Results from last 7 days   Lab Units 05/29/25  1411   GRAM STAIN RESULT  1+ Polys  No No organisms seen  2+ Mononuclear Cells         Lines/Drains:  Invasive Devices       Peripheral Intravenous Line  Duration             Peripheral IV 05/29/25 Right Antecubital <1 day                      Imaging:     XR chest pa and lateral  Result Date: 5/2/2025  Narrative: XR CHEST PA AND LATERAL INDICATION: 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 COMPARISON: None FINDINGS: Clear lungs. No pneumothorax or pleural effusion. Normal cardiomediastinal silhouette. Bones are unremarkable for age. Cholecystectomy clips.     Impression: No acute cardiopulmonary disease. Workstation performed: [id number]                 SURAJ GARCIA MD   Hospitalist - [facility]        ** Please Note:  Documentation is constructed using a voice recognition dictation system.  An occasional wrong word/phrase or “sound-a-like” substitution may have been picked up by dictation device due to the inherent limitations of voice recognition software.  Read the chart carefully and recognize,  using reasonable context, where substitutions may have occurred.**        [1]   Past Medical History:  Diagnosis Date    Hypertension     Syphilis 5/15/25   [2]   Past Surgical History:  Procedure Laterality Date    HAND SURGERY  2000   [3] No Known Allergies  [4]   Social History  Tobacco Use   Smoking Status Never   Smokeless Tobacco Never   [5] No family history on file.

## 2025-05-29 NOTE — ASSESSMENT & PLAN NOTE
Patient developed erythema nodosum in September of last year.  It is unclear whether this is related to supers above.  It would be interesting to observe patient to see whether she has recurrent erythema nodosum after treatment of syphilis.  Monitor for recurrent erythema nodosum after syphilis treatment.    Outpatient records reviewed in detail.  Discussed with patient and her son, who accompanies her, in detail regarding the above plan.  Patient will go to [facility] ER for evaluation and admission for treatment neurosyphilis later today.  We will see patient as inpatient to coordinate treatment of neurosyphilis.

## 2025-05-29 NOTE — ASSESSMENT & PLAN NOTE
"Ongoing issue of bilateral eyes over the last few years, although patient reports \"eye issues\" for a few decades now - follows / [facility] in [place], [place]  Evaluated by outpatient ophthalmology, after recently moving into the area, with positive syphilis screening  See plan for neurosyphilis above  "

## 2025-05-30 ENCOUNTER — APPOINTMENT (INPATIENT)
Dept: INTERVENTIONAL RADIOLOGY/VASCULAR | Facility: HOSPITAL | Age: 60
DRG: 057 | End: 2025-05-30
Attending: INTERNAL MEDICINE
Payer: MEDICARE

## 2025-05-30 LAB
BASOPHILS # BLD AUTO: 0.02 THOUSANDS/ÂΜL (ref 0–0.1)
BASOPHILS NFR BLD AUTO: 0 % (ref 0–1)
EOSINOPHIL # BLD AUTO: 0.04 THOUSAND/ÂΜL (ref 0–0.61)
EOSINOPHIL NFR BLD AUTO: 0 % (ref 0–6)
ERYTHROCYTE [DISTWIDTH] IN BLOOD BY AUTOMATED COUNT: 14.6 % (ref 11.6–15.1)
FERRITIN SERPL-MCNC: 171 NG/ML (ref 30–307)
FOLATE SERPL-MCNC: >22.3 NG/ML
HCT VFR BLD AUTO: 33.6 % (ref 34.8–46.1)
HGB BLD-MCNC: 10.7 G/DL (ref 11.5–15.4)
IMM GRANULOCYTES # BLD AUTO: 0.03 THOUSAND/UL (ref 0–0.2)
IMM GRANULOCYTES NFR BLD AUTO: 0 % (ref 0–2)
IRON SATN MFR SERPL: 13 % (ref 15–50)
IRON SERPL-MCNC: 41 UG/DL (ref 50–212)
LYMPHOCYTES # BLD AUTO: 3.79 THOUSANDS/ÂΜL (ref 0.6–4.47)
LYMPHOCYTES NFR BLD AUTO: 43 % (ref 14–44)
MCH RBC QN AUTO: 26.7 PG (ref 26.8–34.3)
MCHC RBC AUTO-ENTMCNC: 31.8 G/DL (ref 31.4–37.4)
MCV RBC AUTO: 84 FL (ref 82–98)
MONOCYTES # BLD AUTO: 0.53 THOUSAND/ÂΜL (ref 0.17–1.22)
MONOCYTES NFR BLD AUTO: 6 % (ref 4–12)
NEUTROPHILS # BLD AUTO: 4.5 THOUSANDS/ÂΜL (ref 1.85–7.62)
NEUTS SEG NFR BLD AUTO: 51 % (ref 43–75)
NRBC BLD AUTO-RTO: 0 /100 WBCS
PLATELET # BLD AUTO: 287 THOUSANDS/UL (ref 149–390)
PMV BLD AUTO: 10.6 FL (ref 8.9–12.7)
RBC # BLD AUTO: 4.01 MILLION/UL (ref 3.81–5.12)
TIBC SERPL-MCNC: 319.2 UG/DL (ref 250–450)
TRANSFERRIN SERPL-MCNC: 228 MG/DL (ref 203–362)
UIBC SERPL-MCNC: 278 UG/DL (ref 155–355)
VIT B12 SERPL-MCNC: 997 PG/ML (ref 180–914)
WBC # BLD AUTO: 8.91 THOUSAND/UL (ref 4.31–10.16)

## 2025-05-30 PROCEDURE — 85025 COMPLETE CBC W/AUTO DIFF WBC: CPT | Performed by: INTERNAL MEDICINE

## 2025-05-30 PROCEDURE — G0545 PR INHERENT VISIT TO INPT: HCPCS | Performed by: INTERNAL MEDICINE

## 2025-05-30 PROCEDURE — 99232 SBSQ HOSP IP/OBS MODERATE 35: CPT | Performed by: INTERNAL MEDICINE

## 2025-05-30 PROCEDURE — 99223 1ST HOSP IP/OBS HIGH 75: CPT | Performed by: INTERNAL MEDICINE

## 2025-05-30 PROCEDURE — 83550 IRON BINDING TEST: CPT | Performed by: INTERNAL MEDICINE

## 2025-05-30 PROCEDURE — 77002 NEEDLE LOCALIZATION BY XRAY: CPT

## 2025-05-30 PROCEDURE — 82746 ASSAY OF FOLIC ACID SERUM: CPT | Performed by: INTERNAL MEDICINE

## 2025-05-30 PROCEDURE — C1751 CATH, INF, PER/CENT/MIDLINE: HCPCS

## 2025-05-30 PROCEDURE — 36573 INSJ PICC RS&I 5 YR+: CPT

## 2025-05-30 PROCEDURE — 83540 ASSAY OF IRON: CPT | Performed by: INTERNAL MEDICINE

## 2025-05-30 PROCEDURE — 82607 VITAMIN B-12: CPT | Performed by: INTERNAL MEDICINE

## 2025-05-30 PROCEDURE — 76937 US GUIDE VASCULAR ACCESS: CPT

## 2025-05-30 PROCEDURE — 82728 ASSAY OF FERRITIN: CPT | Performed by: INTERNAL MEDICINE

## 2025-05-30 PROCEDURE — 36573 INSJ PICC RS&I 5 YR+: CPT | Performed by: RADIOLOGY

## 2025-05-30 RX ORDER — LIDOCAINE WITH 8.4% SOD BICARB 0.9%(10ML)
SYRINGE (ML) INJECTION AS NEEDED
Status: COMPLETED | OUTPATIENT
Start: 2025-05-30 | End: 2025-05-30

## 2025-05-30 RX ORDER — AMLODIPINE BESYLATE 5 MG/1
5 TABLET ORAL DAILY
Status: DISCONTINUED | OUTPATIENT
Start: 2025-05-30 | End: 2025-06-03 | Stop reason: HOSPADM

## 2025-05-30 RX ORDER — MAGNESIUM SULFATE HEPTAHYDRATE 40 MG/ML
2 INJECTION, SOLUTION INTRAVENOUS ONCE
Status: COMPLETED | OUTPATIENT
Start: 2025-05-30 | End: 2025-05-30

## 2025-05-30 RX ADMIN — SODIUM CHLORIDE 4 MILLION UNITS: 9 INJECTION, SOLUTION INTRAVENOUS at 10:06

## 2025-05-30 RX ADMIN — HYDRALAZINE HYDROCHLORIDE 10 MG: 20 INJECTION INTRAMUSCULAR; INTRAVENOUS at 22:46

## 2025-05-30 RX ADMIN — SODIUM CHLORIDE 4 MILLION UNITS: 9 INJECTION, SOLUTION INTRAVENOUS at 02:04

## 2025-05-30 RX ADMIN — SODIUM CHLORIDE 4 MILLION UNITS: 9 INJECTION, SOLUTION INTRAVENOUS at 20:23

## 2025-05-30 RX ADMIN — SODIUM CHLORIDE 4 MILLION UNITS: 9 INJECTION, SOLUTION INTRAVENOUS at 16:16

## 2025-05-30 RX ADMIN — Medication 10 ML: at 16:05

## 2025-05-30 RX ADMIN — PRAVASTATIN SODIUM 80 MG: 40 TABLET ORAL at 16:16

## 2025-05-30 RX ADMIN — MAGNESIUM SULFATE HEPTAHYDRATE 2 G: 40 INJECTION, SOLUTION INTRAVENOUS at 10:06

## 2025-05-30 RX ADMIN — SODIUM CHLORIDE 4 MILLION UNITS: 9 INJECTION, SOLUTION INTRAVENOUS at 06:19

## 2025-05-30 RX ADMIN — SODIUM CHLORIDE 4 MILLION UNITS: 9 INJECTION, SOLUTION INTRAVENOUS at 23:52

## 2025-05-30 RX ADMIN — AMLODIPINE BESYLATE 5 MG: 5 TABLET ORAL at 10:06

## 2025-05-30 NOTE — PROGRESS NOTES
"Progress Note - Hospitalist   Name: Emily Mejia 60 y.o. female I MRN: [id number]  Unit/Bed#: -01 I Date of Admission: 5/29/2025   Date of Service: 5/30/2025 I Hospital Day: 1    Assessment & Plan  Neurosyphilis  Seen by outpatient Infectious disease, with recommendation to be hospitalized to initiate intravenous antibiotic for neurosyphilis, after lumbar puncture for CSF fluid confirmation (CSF VDRL and infectious panel ordered)  Of note, patient was administered a dose of IV penicillin in the ED prior to lumbar puncture - post-procedure,  continue IV PCN 4,000,000 units Q4H x 14 days per ID recommendation  Supportive care otherwise  Chronic uveitis  Ongoing issue of bilateral eyes over the last few years, although patient reports \"eye issues\" for a few decades now - follows [facility] in [place], [place]  Evaluated by outpatient ophthalmology, after recently moving into the area, with positive syphilis screening  See plan for neurosyphilis above  Hyperlipidemia  Continue statin  Chronic anemia  H/H stable  Elevated blood pressures  No prior history of hypertension, per patient  PRN IV Hydralazine on board  Plan to initiate a standing trial of oral Norvasc  Low-sodium diet  History of erythema nodosum  Monitor for recurrent with initiated neurosyphilis treatment  Outpatient follow-up      VTE Pharmacologic Prophylaxis: VTE Score: 3 Moderate Risk (Score 3-4) - Pharmacological DVT Prophylaxis Contraindicated. Sequential Compression Devices Ordered.    AM-PAC Basic Mobility:  Basic Mobility Inpatient Raw Score: 22  JH-HLM Goal: 7: Walk 25 feet or more  JH-HLM Achieved: 7: Walk 25 feet or more  JH-HLM Goal achieved. Continue to encourage appropriate mobility.    Patient Centered Rounds:  I have performed bedside rounds and discussed plan of care with nursing today.  Discussions with Specialists or Other Care Team Provider:  see above assessments if applicable    Education and Discussions with Family " / Patient:  Patient at bedside, who will self update family, as necessary    Time Spent for Care:  35 minutes. More than 50% of total time spent on counseling and coordination of care, on one or more of the following: performing physical exam; counseling and coordination of care, obtaining or reviewing history, documenting in the medical record, reviewing/ordering tests/medications/procedures, and communicating with other healthcare professionals.    Current Length of Stay: 1 day(s)  Current Patient Status: Inpatient   Certification Statement:  Patient will continue to require additional hospital stay due to assessments as noted above.    Code Status: Level 1 - Full Code      Subjective     Encountered earlier this morning.  Resting fairly comfortably.  Does acknowledge a degree of anxiety, however.      Objective     Vitals:   Temp (24hrs), Av.4 °F (36.9 °C), Min:98.2 °F (36.8 °C), Max:98.8 °F (37.1 °C)    Temp:  [98.2 °F (36.8 °C)-98.8 °F (37.1 °C)] 98.3 °F (36.8 °C)  HR:  [67-86] 81  Resp:  [18] 18  BP: (129-190)/() 160/65  SpO2:  [97 %-100 %] 98 %  Body mass index is 29.45 kg/m².     Input and Output Summary (last 24 hours):       Intake/Output Summary (Last 24 hours) at 2025 1611  Last data filed at 2025 1320  Gross per 24 hour   Intake 890 ml   Output 700 ml   Net 190 ml       Physical Exam:     GENERAL No acute distress   HEAD   Normocephalic - atraumatic   EYES Chronic visual deficits present   MOUTH   Mucosa moist   NECK   Supple - full range of motion   CARDIAC Rate controlled   PULMONARY Clear bilateral breath sounds - respirations nonlabored at rest   ABDOMEN Nontender/nondistended   MUSCULOSKELETAL   Motor strength/range of motion intact   NEUROLOGIC   Alert/oriented at baseline   PSYCHIATRIC   Mood/affect mildly anxious         Labs & Recent Cultures:  Results from last 7 days   Lab Units 25  0624   WBC Thousand/uL 8.91   HEMOGLOBIN g/dL 10.7*   HEMATOCRIT % 33.6*   PLATELETS  Thousands/uL 287   SEGS PCT % 51   LYMPHO PCT % 43   MONO PCT % 6   EOS PCT % 0     Results from last 7 days   Lab Units 05/29/25  1038   POTASSIUM mmol/L 4.1   CHLORIDE mmol/L 106   CO2 mmol/L 25   BUN mg/dL 29*   CREATININE mg/dL 0.76   CALCIUM mg/dL 9.5   ALK PHOS U/L 72   ALT U/L 11   AST U/L 16     Results from last 7 days   Lab Units 05/29/25  1106   INR  0.94                     Results from last 7 days   Lab Units 05/29/25  1411   GRAM STAIN RESULT  1+ Polys  No No organisms seen  2+ Mononuclear Cells         Lines/Drains/Telemetry:  Invasive Devices       Peripherally Inserted Central Catheter Line  Duration             PICC Line 05/30/25 Right Basilic <1 day              Peripheral Intravenous Line  Duration             Peripheral IV 05/29/25 Right Antecubital 1 day    Peripheral IV 05/29/25 Left;Ventral (anterior) Forearm <1 day                    Last 24 Hours Medication List:   Current Facility-Administered Medications   Medication Dose Route Frequency Provider Last Rate    acetaminophen  650 mg Oral Q6H PRN Priscilla Venegas MD      amLODIPine  5 mg Oral Daily Priscilla Venegas MD      hydrALAZINE  10 mg Intravenous Q6H PRN Priscilla Venegas MD      ondansetron  4 mg Intravenous Q4H PRN Priscilla Venegas MD      penicillin G  4 Million Units Intravenous Q4H Priscilla Venegas MD 4 Million Units (05/30/25 1006)    pravastatin  80 mg Oral Daily With Dinner MD PRISCILLA Ramírez MD   Hospitalist - [facility]        ** Please Note:  Documentation is constructed using a voice recognition dictation system.  An occasional wrong word/phrase or “sound-a-like” substitution may have been picked up by dictation device due to the inherent limitations of voice recognition software.  Read the chart carefully and recognize, using reasonable context, where substitutions may have occurred.**

## 2025-05-30 NOTE — PLAN OF CARE
Problem: Potential for Falls  Goal: Patient will remain free of falls  Description: INTERVENTIONS:  - Educate patient/family on patient safety including physical limitations  - Instruct patient to call for assistance with activity   - Consider consulting OT/PT to assist with strengthening/mobility based on AM PAC & JH-HLM score  - Consult OT/PT to assist with strengthening/mobility   - Keep Call bell within reach  - Keep bed low and locked with side rails adjusted as appropriate  - Keep care items and personal belongings within reach  - Initiate and maintain comfort rounds  - Make Fall Risk Sign visible to staff  - Offer Toileting every 2 Hours, in advance of need  - Initiate/Maintain bed alarm  - Obtain necessary fall risk management equipment:  socks  - Apply yellow socks and bracelet for high fall risk patients  - Consider moving patient to room near nurses station  Outcome: Progressing     Problem: PAIN - ADULT  Goal: Verbalizes/displays adequate comfort level or baseline comfort level  Description: Interventions:  - Encourage patient to monitor pain and request assistance  - Assess pain using appropriate pain scale  - Administer analgesics as ordered based on type and severity of pain and evaluate response  - Implement non-pharmacological measures as appropriate and evaluate response  - Consider cultural and social influences on pain and pain management  - Notify physician/advanced practitioner if interventions unsuccessful or patient reports new pain  - Educate patient/family on pain management process including their role and importance of  reporting pain   - Provide non-pharmacologic/complimentary pain relief interventions  Outcome: Progressing     Problem: INFECTION - ADULT  Goal: Absence or prevention of progression during hospitalization  Description: INTERVENTIONS:  - Assess and monitor for signs and symptoms of infection  - Monitor lab/diagnostic results  - Monitor all insertion sites, i.e.  indwelling lines, tubes, and drains  - Monitor endotracheal if appropriate and nasal secretions for changes in amount and color  - [place] appropriate cooling/warming therapies per order  - Administer medications as ordered  - Instruct and encourage patient and family to use good hand hygiene technique  - Identify and instruct in appropriate isolation precautions for identified infection/condition  Outcome: Progressing     Problem: SAFETY ADULT  Goal: Patient will remain free of falls  Description: INTERVENTIONS:  - Educate patient/family on patient safety including physical limitations  - Instruct patient to call for assistance with activity   - Consider consulting OT/PT to assist with strengthening/mobility based on AM PAC & JH-HLM score  - Consult OT/PT to assist with strengthening/mobility   - Keep Call bell within reach  - Keep bed low and locked with side rails adjusted as appropriate  - Keep care items and personal belongings within reach  - Initiate and maintain comfort rounds  - Make Fall Risk Sign visible to staff  - Offer Toileting every 2 Hours, in advance of need  - Initiate/Maintain bed alarm  - Obtain necessary fall risk management equipment:  socks  - Apply yellow socks and bracelet for high fall risk patients  - Consider moving patient to room near nurses station  Outcome: Progressing  Goal: Maintain or return to baseline ADL function  Description: INTERVENTIONS:  -  Assess patient's ability to carry out ADLs; assess patient's baseline for ADL function and identify physical deficits which impact ability to perform ADLs (bathing, care of mouth/teeth, toileting, grooming, dressing, etc.)  - Assess/evaluate cause of self-care deficits   - Assess range of motion  - Assess patient's mobility; develop plan if impaired  - Assess patient's need for assistive devices and provide as appropriate  - Encourage maximum independence but intervene and supervise when necessary  - Involve family in performance  of ADLs  - Assess for home care needs following discharge   - Consider OT consult to assist with ADL evaluation and planning for discharge  - Provide patient education as appropriate  - Monitor functional capacity and physical performance, use of AM PAC & JH-HLM   - Monitor gait, balance and fatigue with ambulation    Outcome: Progressing     Problem: DISCHARGE PLANNING  Goal: Discharge to home or other facility with appropriate resources  Description: INTERVENTIONS:  - Identify barriers to discharge w/patient and caregiver  - Arrange for needed discharge resources and transportation as appropriate  - Identify discharge learning needs (meds, wound care, etc.)  - Arrange for interpretive services to assist at discharge as needed  - Refer to Case Management Department for coordinating discharge planning if the patient needs post-hospital services based on physician/advanced practitioner order or complex needs related to functional status, cognitive ability, or social support system  Outcome: Progressing     Problem: Knowledge Deficit  Goal: Patient/family/caregiver demonstrates understanding of disease process, treatment plan, medications, and discharge instructions  Description: Complete learning assessment and assess knowledge base.  Interventions:  - Provide teaching at level of understanding  - Provide teaching via preferred learning methods  Outcome: Progressing

## 2025-05-30 NOTE — PLAN OF CARE
Problem: PAIN - ADULT  Goal: Verbalizes/displays adequate comfort level or baseline comfort level  Description: Interventions:  - Encourage patient to monitor pain and request assistance  - Assess pain using appropriate pain scale  - Administer analgesics as ordered based on type and severity of pain and evaluate response  - Implement non-pharmacological measures as appropriate and evaluate response  - Consider cultural and social influences on pain and pain management  - Notify physician/advanced practitioner if interventions unsuccessful or patient reports new pain  - Educate patient/family on pain management process including their role and importance of  reporting pain   - Provide non-pharmacologic/complimentary pain relief interventions  Outcome: Progressing   Patient has no reports of pain at this time.

## 2025-05-30 NOTE — CASE MANAGEMENT
Case Management Assessment & Discharge Planning Note    Patient name Emily Bahena  Location /-01 MRN [id number]  : 1965 Date 2025       Current Admission Date: 2025  Current Admission Diagnosis:Neurosyphilis   Patient Active Problem List    Diagnosis Date Noted    Chronic uveitis 2025    Hyperlipidemia 2025    Chronic anemia 2025    Elevated blood pressures 2025    History of erythema nodosum 2025    Mixed hyperlipidemia 2025    Neurosyphilis 2025    Erythema nodosum 10/08/2024      LOS (days): 1  Geometric Mean LOS (GMLOS) (days): 3.8  Days to GMLOS:2.8     OBJECTIVE:    Risk of Unplanned Readmission Score: 5.71      Current admission status: Inpatient    Preferred Pharmacy:   [id number] [place] - [address]  [address]  [place] [address]  Phone: [phone] Fax: [phone]    Primary Care Provider: Sarah Hull PA-C    Primary Insurance: MEDICARE  Secondary Insurance: [facility]    ASSESSMENT:  Active Health Care Proxies    There are no active Health Care Proxies on file.       Advance Directives  Does patient have a Health Care POA?: No  Was patient offered paperwork?:  (Paperwork provided)  Does patient currently have a Health Care decision maker?: Yes, please see Health Care Proxy section  Does patient have Advance Directives?: No  Was patient offered paperwork?: Yes (Paperwork provided)  Primary Contact: Geovanny bahena son    Readmission Root Cause  30 Day Readmission: No    Patient Information  Admitted from:: Home  Mental Status: Alert  During Assessment patient was accompanied by: Not accompanied during assessment  Assessment information provided by:: Patient  Primary Caregiver: Child  Caregiver's Name:: Btrian  Caregiver's Relationship to Patient:: Family Member  Caregiver's Telephone Number:: [phone]  Support Systems: Son  County of Residence: [place]  What city do you live in?:  [place]  Home entry access options. Select all that apply.: Stairs  Number of steps to enter home.: 2  Do the steps have railings?: Yes  Type of Current Residence: [address] [place] home  Upon entering residence, is there a bedroom on the main floor (no further steps)?: Yes  Upon entering residence, is there a bathroom on the main floor (no further steps)?: Yes  Living Arrangements: Lives w/ Family members  Is patient a ?: No    Activities of Daily Living Prior to Admission  Functional Status: Independent  Completes ADLs independently?: Yes  Ambulates independently?: Yes  Does patient use assisted devices?: Yes  Assisted Devices (DME) used: Straight Cane  Does patient currently own DME?: Yes  What DME does the patient currently own?: Straight Cane  Does patient have a history of Outpatient Therapy (PT/OT)?: No  Does the patient have a history of Short-Term Rehab?: No  Does patient have a history of HHC?: No  Does patient currently have HHC?: No    Patient Information Continued  Income Source: Pension/[employer]  Does patient have prescription coverage?: Yes  Can the patient afford their medications and any related supplies (such as glucometers or test strips)?: Yes  Does patient receive dialysis treatments?: No  Does patient have a history of substance abuse?: No    Means of Transportation  Means of Transport to Appts:: Family transport    DISCHARGE DETAILS:    Discharge planning discussed with:: Pt  Freedom of Choice: Yes  Comments - Freedom of Choice: Pt is in agreement with a referral being made via [facility]IN for HHC services and will chose from the provider list     Contacts  Patient Contacts: Geovanny Mejia Son  Relationship to Patient:: Family  Contact Method: Phone  Phone Number: [phone]  Reason/Outcome: Emergency Contact    Requested Home Health Care         Is the patient interested in HHC at discharge?: Yes  Home Health Discipline requested:: Nursing  HHA External Referral Reason (only applicable if external  HHA name selected): Patient has established relationship with provider  Home Health Follow-Up Provider:: PCP  Home Health Services Needed:: Evaluate Functional Status and Safety, Central Line Care, Other (comment) (Teach IVABX administration)  Homebound Criteria Met:: Uses an Assist Device (i.e. cane, walker, etc), Requires the Assistance of Another Person for Safe Ambulation or to Leave the Home  Supporting Clincal Findings:: Fatigues Easliy in Short Distances, Limited Endurance (Legally blind)    Treatment Team Recommendation: Home with Home Health Care  Discharge Destination Plan:: Home with Home Health Care:  Pt was admitted to the hospital for nuerosyphilis. Evaluated the pt at the bedside.  Pt is legally blind.  Performs own ADLS and cleans her room, takes care of her dog.  Family performs all other IADL's.   AS per SLIM the pt will have a PICC line placed today.  Will need a course of 14 days if IVABX.  Spoke to the pt about same and the need for family to learn to give the IVABX at home.  Pt states she lives with her son and LORI.  LORI is home during the day with her and they both can learn.  Pt is in agreement with a referral being made via A[facility]IN for HHC RN.  Referral made.  Will need ID script to send to [facility] when received.  Family will transport home upon DC.

## 2025-05-30 NOTE — ASSESSMENT & PLAN NOTE
No prior history of hypertension, per patient  PRN IV Hydralazine on board  Plan to initiate a standing trial of oral Norvasc  Low-sodium diet

## 2025-05-30 NOTE — ASSESSMENT & PLAN NOTE
Work up initiated by patient's new ophthalmologist in setting of chronic worsening B/L uveitis. Patient with positive syphilis screen, positive treponema screen antibody, and confirmatory second treponema antibody also positive. She denies prior syphilis history and treatment. Given ocular manifestations, neurosyphilis likely. LP performed yesterday to assess for level of CSF inflammation. Tap was traumatic. CSF fluid WBC = 7, 30% neutrophils and 63% lymphs.  CSF protein = 54. CSF glucose = 58. CSF VRDL, Gram stain, and formal culture are pending. Patient received a dose of Pencillin G prior to the LP and then started q4 hour dosing post procedure. Patient is tolerating the penicillin without difficulty. No evidence of Jarisch-Herxheimer reaction at this time. I recommend the patient remain inpatient through the weekend. If she continues to tolerate antibiotic treatment we can work on planning for her to continue her remaining 14-day treatment course in the outpatient setting.  -continue IV Penicillin G, 4 million units q4 hours through 6/12/2025 for 14 days total treatment  -anticipate likely discharge early next week, she will need a PICC prior to leaving and is cleared for placement at any time   -check CBCD and BMP tomorrow  -follow up final CSF fluid studies  -monitor vitals  -serial neuro exams

## 2025-05-30 NOTE — CONSULTS
Consultation - Infectious Disease   Name: Emily Mejia 60 y.o. female I MRN: [id number]  Unit/Bed#: -01 I Date of Admission: 5/29/2025   Date of Service: 5/30/2025 I Hospital Day: 1     Administrative Statements   VIRTUAL CARE DOCUMENTATION:     1. This service was provided via Telemedicine using theDrop Kit     2. Parties in the room with patient during teleconsult Patient only    3. Confidentiality My office door was closed     4. Participants No one else was in the room    5. Patient acknowledged consent and understanding of privacy and security of the  Telemedicine consult. I informed the patient that I have reviewed their record in Epic and presented the opportunity for them to ask any questions regarding the visit today.  The patient agreed to participate.    6. I have spent a total time of 60 minutes in caring for this patient on the day of the visit/encounter including Diagnostic results, Instructions for management, Patient and family education, Impressions, Counseling / Coordination of care, Documenting in the medical record, Reviewing/placing orders in the medical record (including tests, medications, and/or procedures), Obtaining or reviewing history  , and Communicating with other healthcare professionals , not including the time spent for establishing the audio/video connection.     Inpatient consult to Infectious Diseases  Consult performed by: KRISTY Weaver  Consult ordered by: Priscilla Venegas MD      Physician Requesting Evaluation: Priscilla Venegas MD   Reason for Evaluation / Principal Problem: Neurosyphilis  Assessment & Plan  Neurosyphilis  Work up initiated by patient's new ophthalmologist in setting of chronic worsening B/L uveitis. Patient with positive syphilis screen, positive treponema screen antibody, and confirmatory second treponema antibody also positive. She denies prior syphilis history and treatment. Given ocular manifestations, neurosyphilis likely. LP performed  yesterday to assess for level of CSF inflammation. Tap was traumatic. CSF fluid WBC = 7, 30% neutrophils and 63% lymphs.  CSF protein = 54. CSF glucose = 58. CSF VRDL, Gram stain, and formal culture are pending. Patient received a dose of Pencillin G prior to the LP and then started q4 hour dosing post procedure. Patient is tolerating the penicillin without difficulty. No evidence of Jarisch-Herxheimer reaction at this time. I recommend the patient remain inpatient through the weekend. If she continues to tolerate antibiotic treatment we can work on planning for her to continue her remaining 14-day treatment course in the outpatient setting.  -continue IV Penicillin G, 4 million units q4 hours through 6/12/2025 for 14 days total treatment  -anticipate likely discharge early next week, she will need a PICC prior to leaving and is cleared for placement at any time   -check CBCD and BMP tomorrow  -follow up final CSF fluid studies  -monitor vitals  -serial neuro exams  Chronic uveitis  With R eye nearly blind. Likely secondary to neurosyphilis given recent positive syphilis screening. Patient follows outpatient with ophthalmology   -antibiotic as above  -resume outpatient ophthalmology follow up after discharge  History of erythema nodosum  Noted in 9/2024. Unclear if related to syphilis above.   -serial skin exams  -monitor for recurring rash     The patient will be formally reassessed by the infectious disease team on Monday, 6/2/2025. Please call sooner with new questions or concerns.     Above plan was discussed in detail with patient over the TV kit.  Above plan was discussed in detail with MIKI who agrees with plan for ongoing IV penicillin treatment.    History   HPI: Emily Mejia is a 60 y.o. year old female who presented to the [facility] ED on 5/29/2025 for LP and treatment of neurosyphilis. The patient had been seen in the outpatient infectious disease office that morning after a positive syphilis  screening by her ophthalmologist. Patient with progressing B/L uveitis followed for many years at [facility]. She recently started following with a new ophthalmologist who was concerned for neurosyphilis. Screening came back positive. She was recommended for admission in [place] but declined. She was referred to outpatient ID for care. This morning she had initial ID assessment and was instructed to get to the hospital right away for LP work up and admission.     Upon arrival to the ED the patient's temperature was 97.6. HR was 74. RR was 16. O2 saturation was 99% on room air. BP was 174/77. WBC count was 9.37. Creatinine was 0.76 with GFR = 85. No LFT elevation.  The patient was started on Penicillin G. She was admitted for additional medical management.    After her admission patient to IR for LP. Tap was traumatic with red blood cells in every tube. The patient has continued on Penicillin G. We have been asked for formal consult for neurosyphilis.    The patient has HTN, chronic uveitis, hyperlipidemia, and anemia. She has a history of erythema nodosum and hand surgery. She uses marijuana. She has no known allergies.     Review of Systems  Patient reports she's doing alright this morning. Reports her LP went well yesterday, is not having any acute issues with back pain. Has noticed some occasional numbness in the L foot but not at time of my visit. Reports no spontaneous loss of bladder/bowel control. Has not had BM since admission but has been urinating as normal and without difficulty. She has no fever, chills, sweats or shakes today. She denies difficulty breathing, chest pain, and cough. She denies nausea, vomiting, abdominal pain, diarrhea. She's tolerating her breakfast without difficulty. No changes in her vision this morning. Has been feeling anxious at times and is hoping for a PRN for help. Rest of complete 12 point system-based review of systems is otherwise negative.    PAST MEDICAL  HISTORY:  Diagnosis Date    Hypertension     Syphilis 5/15/25     Procedure Laterality Date    HAND SURGERY       FAMILY HISTORY:  Non-contributory    SOCIAL HISTORY:  Social History     Social History     Substance and Sexual Activity   Alcohol Use Yes    Alcohol/week: 1.0 standard drink of alcohol    Types: 1 Shots of liquor per week    Comment: socially     Social History     Substance and Sexual Activity   Drug Use Yes    Frequency: 5.0 times per week    Types: Marijuana     Tobacco Use   Smoking Status Never   Smokeless Tobacco Never     ALLERGIES:  No Known Allergies    MEDICATIONS:  All current active medications have been reviewed.    ANTIBIOTICS:  Penicillin G 2  Antibiotics 2    Physical Exam   Temp:  [97.5 °F (36.4 °C)-98.8 °F (37.1 °C)] 98.2 °F (36.8 °C)  HR:  [53-86] 86  Resp:  [16-18] 18  BP: (129-190)/() 151/62  SpO2:  [96 %-100 %] 98 %  Temp (24hrs), Av.2 °F (36.8 °C), Min:97.5 °F (36.4 °C), Max:98.8 °F (37.1 °C)  Current: Temperature: 98.2 °F (36.8 °C)    Intake/Output Summary (Last 24 hours) at 2025 0708  Last data filed at 2025 1934  Gross per 24 hour   Intake 700 ml   Output --   Net 700 ml     Physical exam findings reported by bedside and primary medical team staff, also observed over TV kit:  General Appearance:  Appearing well, nontoxic, and in no distress. She appears comfortable sitting up in bed having breakfast.   Head:  Normocephalic, without obvious abnormality, atraumatic.   Eyes:  Conjunctiva pink and sclera anicteric, both eyes. Chronic visual deficits.   Nose: Nares normal, mucosa normal, no drainage.   Throat: Oropharynx moist without lesions.   Neck: Supple, symmetrical, no adenopathy, no tenderness/mass/nodules.   Back:   Symmetric, ROM normal.   Lungs:   Clear to auscultation bilaterally, respirations unlabored on room air.   Chest Wall:  No tenderness or deformity.   Heart:  RRR; no murmur, rub or gallop.   Abdomen:   Soft, non-tender,  non-distended, positive bowel sounds throughout.   Extremities: No cyanosis or clubbing, no edema.   Skin: No rashes or lesions noted on exposed skin.   Neurologic: Alert and oriented times 3, follows commands, speech is organized and appropriate, symmetric facial movement.     Invasive Devices:   Peripheral IV 05/29/25 Right Antecubital (Active)   Site Assessment WDL 05/29/25 2332   Dressing Type Transparent 05/29/25 2332   Line Status Flushed 05/29/25 2332   Dressing Status Clean;Dry;Intact 05/29/25 2332       Peripheral IV 05/29/25 Left;Ventral (anterior) Forearm (Active)   Site Assessment WDL 05/29/25 2346   Dressing Type Transparent 05/29/25 2346   Line Status Flushed;Blood return noted 05/29/25 2346   Dressing Status Dry;Intact;Clean 05/29/25 2346       Labs, Imaging, & Other Studies   I have personally reviewed pertinent labs.    Results from last 7 days   Lab Units 05/29/25  1106 05/29/25  1038   WBC Thousand/uL 9.05 9.37   HEMOGLOBIN g/dL 10.3* 10.5*   PLATELETS Thousands/uL 249 254     Results from last 7 days   Lab Units 05/29/25  1038   POTASSIUM mmol/L 4.1   CHLORIDE mmol/L 106   CO2 mmol/L 25   BUN mg/dL 29*   CREATININE mg/dL 0.76   EGFR ml/min/1.73sq m 85   CALCIUM mg/dL 9.5   AST U/L 16   ALT U/L 11   ALK PHOS U/L 72     Results from last 7 days   Lab Units 05/29/25  1411   GRAM STAIN RESULT  1+ Polys  No No organisms seen  2+ Mononuclear Cells     Imaging Studies:   No imaging so far during this hospitalization.    Other Studies:   CSF fluid culture and Gram stain are pending.

## 2025-05-30 NOTE — ASSESSMENT & PLAN NOTE
Noted in 9/2024. Unclear if related to syphilis above.   -serial skin exams  -monitor for recurring rash

## 2025-05-30 NOTE — ASSESSMENT & PLAN NOTE
Monitor for recurrent with initiated neurosyphilis treatment  Outpatient follow-up   Please inform patient that drug screen is reassuring.  Continue with all current medications

## 2025-05-30 NOTE — ASSESSMENT & PLAN NOTE
"Ongoing issue of bilateral eyes over the last few years, although patient reports \"eye issues\" for a few decades now - follows / [facility] in [place], [place]  Evaluated by outpatient ophthalmology, after recently moving into the area, with positive syphilis screening  See plan for neurosyphilis above  "

## 2025-05-30 NOTE — ASSESSMENT & PLAN NOTE
With R eye nearly blind. Likely secondary to neurosyphilis given recent positive syphilis screening. Patient follows outpatient with ophthalmology   -antibiotic as above  -resume outpatient ophthalmology follow up after discharge

## 2025-05-30 NOTE — CASE MANAGEMENT
Case Management Discharge Planning Note    Patient name Emily Mejia  Location /-01 MRN [id number]  : 1965 Date 2025       Current Admission Date: 2025  Current Admission Diagnosis:Neurosyphilis   Patient Active Problem List    Diagnosis Date Noted    Chronic uveitis 2025    Hyperlipidemia 2025    Chronic anemia 2025    Elevated blood pressures 2025    History of erythema nodosum 2025    Mixed hyperlipidemia 2025    Neurosyphilis 2025    Erythema nodosum 10/08/2024      LOS (days): 1  Geometric Mean LOS (GMLOS) (days): 3.8  Days to GMLOS:2.6     OBJECTIVE:  Risk of Unplanned Readmission Score: 5.79      Current admission status: Inpatient   Preferred Pharmacy:   [id number] [place] - [address]  [address]  [place] [address]  Phone: [phone] Fax: [phone]    Primary Care Provider: Sarah Hull PA-C    Primary Insurance: MEDICARE  Secondary Insurance: [facility]    DISCHARGE DETAILS:  Printed the choice list from ISIAH for the pt.  Spoke to the pt who would like her son to make the choice.  TC to pt son becky who states he will come into the hospital tomorrow and will chose an agency.

## 2025-05-30 NOTE — ASSESSMENT & PLAN NOTE
Seen by outpatient Infectious disease, with recommendation to be hospitalized to initiate intravenous antibiotic for neurosyphilis, after lumbar puncture for CSF fluid confirmation (CSF VDRL and infectious panel ordered)  Of note, patient was administered a dose of IV penicillin in the ED prior to lumbar puncture - post-procedure,  continue IV PCN 4,000,000 units Q4H x 14 days per ID recommendation  Supportive care otherwise

## 2025-05-31 LAB
ANION GAP SERPL CALCULATED.3IONS-SCNC: 7 MMOL/L (ref 4–13)
BASOPHILS # BLD AUTO: 0.02 THOUSANDS/ÂΜL (ref 0–0.1)
BASOPHILS NFR BLD AUTO: 0 % (ref 0–1)
BUN SERPL-MCNC: 16 MG/DL (ref 5–25)
CALCIUM SERPL-MCNC: 9.3 MG/DL (ref 8.4–10.2)
CHLORIDE SERPL-SCNC: 105 MMOL/L (ref 96–108)
CO2 SERPL-SCNC: 25 MMOL/L (ref 21–32)
CREAT SERPL-MCNC: 0.71 MG/DL (ref 0.6–1.3)
EOSINOPHIL # BLD AUTO: 0.06 THOUSAND/ÂΜL (ref 0–0.61)
EOSINOPHIL NFR BLD AUTO: 1 % (ref 0–6)
ERYTHROCYTE [DISTWIDTH] IN BLOOD BY AUTOMATED COUNT: 14.6 % (ref 11.6–15.1)
GFR SERPL CREATININE-BSD FRML MDRD: 92 ML/MIN/1.73SQ M
GLUCOSE SERPL-MCNC: 100 MG/DL (ref 65–140)
HCT VFR BLD AUTO: 30.2 % (ref 34.8–46.1)
HGB BLD-MCNC: 9.7 G/DL (ref 11.5–15.4)
IMM GRANULOCYTES # BLD AUTO: 0.02 THOUSAND/UL (ref 0–0.2)
IMM GRANULOCYTES NFR BLD AUTO: 0 % (ref 0–2)
LYMPHOCYTES # BLD AUTO: 3.79 THOUSANDS/ÂΜL (ref 0.6–4.47)
LYMPHOCYTES NFR BLD AUTO: 47 % (ref 14–44)
MAGNESIUM SERPL-MCNC: 1.7 MG/DL (ref 1.9–2.7)
MCH RBC QN AUTO: 26.7 PG (ref 26.8–34.3)
MCHC RBC AUTO-ENTMCNC: 32.1 G/DL (ref 31.4–37.4)
MCV RBC AUTO: 83 FL (ref 82–98)
MONOCYTES # BLD AUTO: 0.52 THOUSAND/ÂΜL (ref 0.17–1.22)
MONOCYTES NFR BLD AUTO: 6 % (ref 4–12)
NEUTROPHILS # BLD AUTO: 3.68 THOUSANDS/ÂΜL (ref 1.85–7.62)
NEUTS SEG NFR BLD AUTO: 46 % (ref 43–75)
NRBC BLD AUTO-RTO: 0 /100 WBCS
PLATELET # BLD AUTO: 256 THOUSANDS/UL (ref 149–390)
PMV BLD AUTO: 9.7 FL (ref 8.9–12.7)
POTASSIUM SERPL-SCNC: 4 MMOL/L (ref 3.5–5.3)
RBC # BLD AUTO: 3.63 MILLION/UL (ref 3.81–5.12)
SODIUM SERPL-SCNC: 137 MMOL/L (ref 135–147)
WBC # BLD AUTO: 8.09 THOUSAND/UL (ref 4.31–10.16)

## 2025-05-31 PROCEDURE — 99232 SBSQ HOSP IP/OBS MODERATE 35: CPT | Performed by: INTERNAL MEDICINE

## 2025-05-31 PROCEDURE — 85025 COMPLETE CBC W/AUTO DIFF WBC: CPT | Performed by: INTERNAL MEDICINE

## 2025-05-31 PROCEDURE — 83735 ASSAY OF MAGNESIUM: CPT | Performed by: INTERNAL MEDICINE

## 2025-05-31 PROCEDURE — 80048 BASIC METABOLIC PNL TOTAL CA: CPT | Performed by: INTERNAL MEDICINE

## 2025-05-31 RX ORDER — MAGNESIUM SULFATE HEPTAHYDRATE 40 MG/ML
4 INJECTION, SOLUTION INTRAVENOUS ONCE
Status: COMPLETED | OUTPATIENT
Start: 2025-05-31 | End: 2025-05-31

## 2025-05-31 RX ADMIN — SODIUM CHLORIDE 4 MILLION UNITS: 9 INJECTION, SOLUTION INTRAVENOUS at 20:31

## 2025-05-31 RX ADMIN — SODIUM CHLORIDE 4 MILLION UNITS: 9 INJECTION, SOLUTION INTRAVENOUS at 17:20

## 2025-05-31 RX ADMIN — PRAVASTATIN SODIUM 80 MG: 40 TABLET ORAL at 17:20

## 2025-05-31 RX ADMIN — AMLODIPINE BESYLATE 5 MG: 5 TABLET ORAL at 08:40

## 2025-05-31 RX ADMIN — ACETAMINOPHEN 650 MG: 325 TABLET ORAL at 08:52

## 2025-05-31 RX ADMIN — ONDANSETRON 4 MG: 2 INJECTION INTRAMUSCULAR; INTRAVENOUS at 08:52

## 2025-05-31 RX ADMIN — MAGNESIUM SULFATE HEPTAHYDRATE 4 G: 40 INJECTION, SOLUTION INTRAVENOUS at 10:27

## 2025-05-31 RX ADMIN — SODIUM CHLORIDE 4 MILLION UNITS: 9 INJECTION, SOLUTION INTRAVENOUS at 08:41

## 2025-05-31 RX ADMIN — SODIUM CHLORIDE 4 MILLION UNITS: 9 INJECTION, SOLUTION INTRAVENOUS at 04:09

## 2025-05-31 RX ADMIN — SODIUM CHLORIDE 4 MILLION UNITS: 9 INJECTION, SOLUTION INTRAVENOUS at 13:25

## 2025-05-31 NOTE — PROGRESS NOTES
Patient:    MRN:  [id number]    Aidin Request ID:  [id number]    Level of care reserved:  Home Health Agency    Partner Reserved:  [facility] [place] [address] [phone]    Clinical needs requested:    Geography searched:  [id number]    Start of Service:    Request sent:  10:13am EDT on 5/30/2025 by Kylah Tripp    Partner reserved:  2:49pm EDT on 5/31/2025 by Kylah Tripp    Choice list shared:  2:58pm EDT on 5/30/2025 by Kylah Tripp

## 2025-05-31 NOTE — PLAN OF CARE
Problem: INFECTION - ADULT  Goal: Absence or prevention of progression during hospitalization  Description: INTERVENTIONS:  - Assess and monitor for signs and symptoms of infection  - Monitor lab/diagnostic results  - Monitor all insertion sites, i.e. indwelling lines, tubes, and drains  - Monitor endotracheal if appropriate and nasal secretions for changes in amount and color  - [place] appropriate cooling/warming therapies per order  - Administer medications as ordered  - Instruct and encourage patient and family to use good hand hygiene technique  - Identify and instruct in appropriate isolation precautions for identified infection/condition  Outcome: Progressing           Call placed to patient's mother, updated on results and recommendations. Mother reports that patient denies episodes of diarrhea on 10/27/23 & 10/28/23, had an episode on 10/29/23, no diarrhea on 10/30/23, but patient did report that he \"almost had an issue at school\". Spoke with provider, appears that patient is improving, no indication for treatment at this time. Update given to mother, encourage fluid intake to avoid dehydration, mother states understanding.

## 2025-05-31 NOTE — ASSESSMENT & PLAN NOTE
"Ongoing issue of bilateral eyes over the last few years, although patient reports \"eye issues\" for a few decades now - follows / [facility] in [place], [place]  Evaluated by outpatient ophthalmology, after recently moving into the area, with positive syphilis screening  See plan for neurosyphilis above  "

## 2025-05-31 NOTE — ASSESSMENT & PLAN NOTE
No prior history of hypertension, per patient  PRN IV Hydralazine on board  Initiated on daily/standing Norvasc with improvement in BP  Low-sodium diet

## 2025-05-31 NOTE — PLAN OF CARE
Problem: PAIN - ADULT  Goal: Verbalizes/displays adequate comfort level or baseline comfort level  Description: Interventions:  - Encourage patient to monitor pain and request assistance  - Assess pain using appropriate pain scale  - Administer analgesics as ordered based on type and severity of pain and evaluate response  - Implement non-pharmacological measures as appropriate and evaluate response  - Consider cultural and social influences on pain and pain management  - Notify physician/advanced practitioner if interventions unsuccessful or patient reports new pain  - Educate patient/family on pain management process including their role and importance of  reporting pain   - Provide non-pharmacologic/complimentary pain relief interventions  Outcome: Progressing   Prn meds given for heahache

## 2025-05-31 NOTE — CASE MANAGEMENT
Case Management Discharge Planning Note    Patient name Emily Mejia  Location /-01 MRN [id number]  : 1965 Date 2025       Current Admission Date: 2025  Current Admission Diagnosis:Neurosyphilis   Patient Active Problem List    Diagnosis Date Noted    Chronic uveitis 2025    Hyperlipidemia 2025    Chronic anemia 2025    Elevated blood pressures 2025    History of erythema nodosum 2025    Mixed hyperlipidemia 2025    Neurosyphilis 2025    Erythema nodosum 10/08/2024      LOS (days): 2  Geometric Mean LOS (GMLOS) (days): 3.8  Days to GMLOS:1.6     OBJECTIVE:  Risk of Unplanned Readmission Score: 5.2      Current admission status: Inpatient   Preferred Pharmacy:   [id number] [place] - [address]  [address]  [place] [address]  Phone: [phone] Fax: [phone]    Primary Care Provider: Sarah Hull PA-C    Primary Insurance: MEDICARE  Secondary Insurance: [facility]    DISCHARGE DETAILS:    Comments - Freedom of Choice: PT son and DIL chose [facility] from the provider list,  Reserved in AIDIN   Spoke to the son and DIL at the bedside who chose [facility] for HHC services.  Notified the HHC agency via AIDIN the ID script has not been received and will need to be ordered through [facility] on Monday.  Probably will start HHC services on Tuesday depending on when IVABX can be delivered.

## 2025-05-31 NOTE — PROGRESS NOTES
"Progress Note - Hospitalist   Name: Emily Mejia 60 y.o. female I MRN: [id number]  Unit/Bed#: -01 I Date of Admission: 5/29/2025   Date of Service: 5/31/2025 I Hospital Day: 2    Assessment & Plan  Neurosyphilis  Seen by outpatient Infectious disease, with recommendation to be hospitalized to initiate intravenous antibiotic for neurosyphilis, after lumbar puncture for CSF fluid confirmation (CSF VDRL and infectious panel ordered)  Of note, patient was administered a dose of IV penicillin in the ED prior to lumbar puncture - post-procedure,  continue IV PCN 4,000,000 units Q4H x 14 days per ID recommendation  Supportive care otherwise  PICC line placed yesterday -> assess antibiotic toleration over the weekend (along with monitoring for Jarisch-Herxheimer reaction), with hopeful discharge home with VNA services on Monday, 6/2  Chronic uveitis  Ongoing issue of bilateral eyes over the last few years, although patient reports \"eye issues\" for a few decades now - follows / [facility] in [place], [place]  Evaluated by outpatient ophthalmology, after recently moving into the area, with positive syphilis screening  See plan for neurosyphilis above  Hyperlipidemia  Continue statin  Chronic anemia  H/H stable  Elevated blood pressures  No prior history of hypertension, per patient  PRN IV Hydralazine on board  Initiated on daily/standing Norvasc with improvement in BP  Low-sodium diet  History of erythema nodosum  Monitor for recurrent with initiated neurosyphilis treatment  Outpatient follow-up      VTE Pharmacologic Prophylaxis: VTE Score: 3 Moderate Risk (Score 3-4) - Pharmacological DVT Prophylaxis Contraindicated. Sequential Compression Devices Ordered.    AM-PAC Basic Mobility:  Basic Mobility Inpatient Raw Score: 22  JH-HLM Goal: 7: Walk 25 feet or more  JH-HLM Achieved: 7: Walk 25 feet or more  JH-HLM Goal achieved. Continue to encourage appropriate mobility.    Patient Centered Rounds:  I have " performed bedside rounds and discussed plan of care with nursing today.  Discussions with Specialists or Other Care Team Provider:  see above assessments if applicable    Education and Discussions with Family / Patient:  Patient at bedside, who will self update family, as necessary    Time Spent for Care:  35 minutes. More than 50% of total time spent on counseling and coordination of care, on one or more of the following: performing physical exam; counseling and coordination of care, obtaining or reviewing history, documenting in the medical record, reviewing/ordering tests/medications/procedures, and communicating with other healthcare professionals.    Current Length of Stay: 2 day(s)  Current Patient Status: Inpatient   Certification Statement:  Patient will continue to require additional hospital stay due to assessments as noted above.    Code Status: Level 1 - Full Code      Subjective     Encountered earlier today.  Complain of some mild nausea and headache earlier, now resolved.  Nuys fever/chills or other constitutional symptoms currently.      Objective     Vitals:   Temp (24hrs), Av.3 °F (36.8 °C), Min:98.3 °F (36.8 °C), Max:98.3 °F (36.8 °C)    Temp:  [98.3 °F (36.8 °C)] 98.3 °F (36.8 °C)  HR:  [] 75  Resp:  [18] 18  BP: (115-188)/(56-85) 138/65  SpO2:  [92 %-99 %] 92 %  Body mass index is 29.45 kg/m².     Input and Output Summary (last 24 hours):       Intake/Output Summary (Last 24 hours) at 2025 1324  Last data filed at 2025 1253  Gross per 24 hour   Intake 742 ml   Output 900 ml   Net -158 ml       Physical Exam:     GENERAL No immediate distress at rest   HEAD   Normocephalic - atraumatic   EYES Chronic visual deficits noted   MOUTH   Mucosa moist   NECK   Supple - full range of motion   CARDIAC Rate controlled   PULMONARY Clear to auscultation bilaterally without labored respirations at rest   ABDOMEN Nontender/nondistended   MUSCULOSKELETAL   Motor strength/range of motion  intact   NEUROLOGIC   Alert/oriented at baseline   PSYCHIATRIC   Mood/affect less anxious currently         Labs & Recent Cultures:  Results from last 7 days   Lab Units 05/31/25  0444   WBC Thousand/uL 8.09   HEMOGLOBIN g/dL 9.7*   HEMATOCRIT % 30.2*   PLATELETS Thousands/uL 256   SEGS PCT % 46   LYMPHO PCT % 47*   MONO PCT % 6   EOS PCT % 1     Results from last 7 days   Lab Units 05/31/25  0531 05/29/25  1038   POTASSIUM mmol/L 4.0 4.1   CHLORIDE mmol/L 105 106   CO2 mmol/L 25 25   BUN mg/dL 16 29*   CREATININE mg/dL 0.71 0.76   CALCIUM mg/dL 9.3 9.5   ALK PHOS U/L  --  72   ALT U/L  --  11   AST U/L  --  16     Results from last 7 days   Lab Units 05/29/25  1106   INR  0.94                     Results from last 7 days   Lab Units 05/29/25  1411   GRAM STAIN RESULT  1+ Polys  No No organisms seen  2+ Mononuclear Cells         Lines/Drains/Telemetry:  Invasive Devices       Peripherally Inserted Central Catheter Line  Duration             PICC Line 05/30/25 Right Basilic <1 day                    Last 24 Hours Medication List:   Current Facility-Administered Medications   Medication Dose Route Frequency Provider Last Rate    acetaminophen  650 mg Oral Q6H PRN Priscilla Venegas MD      amLODIPine  5 mg Oral Daily Priscilla Venegas MD      hydrALAZINE  10 mg Intravenous Q6H PRN Priscilla Venegas MD      magnesium sulfate  4 g Intravenous Once Priscilla Venegas MD 4 g (05/31/25 1027)    ondansetron  4 mg Intravenous Q4H PRN Priscilla Venegas MD      penicillin G  4 Million Units Intravenous Q4H Priscilla Venegas MD 4 Million Units (05/31/25 0841)    pravastatin  80 mg Oral Daily With Dinner MD PRISCILLA Ramírez MD   Hospitalist - [facility] Internal Medicine        ** Please Note:  Documentation is constructed using a voice recognition dictation system.  An occasional wrong word/phrase or “sound-a-like” substitution may have been picked up by dictation device due to the inherent limitations of voice recognition  software.  Read the chart carefully and recognize, using reasonable context, where substitutions may have occurred.**

## 2025-05-31 NOTE — ASSESSMENT & PLAN NOTE
Seen by outpatient Infectious disease, with recommendation to be hospitalized to initiate intravenous antibiotic for neurosyphilis, after lumbar puncture for CSF fluid confirmation (CSF VDRL and infectious panel ordered)  Of note, patient was administered a dose of IV penicillin in the ED prior to lumbar puncture - post-procedure,  continue IV PCN 4,000,000 units Q4H x 14 days per ID recommendation  Supportive care otherwise  PICC line placed yesterday -> assess antibiotic toleration over the weekend (along with monitoring for Jarisch-Herxheimer reaction), with hopeful discharge home with VNA services on Monday, 6/2

## 2025-06-01 LAB
ANION GAP SERPL CALCULATED.3IONS-SCNC: 6 MMOL/L (ref 4–13)
BACTERIA CSF CULT: NO GROWTH
BASOPHILS # BLD AUTO: 0.02 THOUSANDS/ÂΜL (ref 0–0.1)
BASOPHILS NFR BLD AUTO: 0 % (ref 0–1)
BUN SERPL-MCNC: 16 MG/DL (ref 5–25)
CALCIUM SERPL-MCNC: 9.5 MG/DL (ref 8.4–10.2)
CHLORIDE SERPL-SCNC: 104 MMOL/L (ref 96–108)
CO2 SERPL-SCNC: 26 MMOL/L (ref 21–32)
CREAT SERPL-MCNC: 0.73 MG/DL (ref 0.6–1.3)
EOSINOPHIL # BLD AUTO: 0.14 THOUSAND/ÂΜL (ref 0–0.61)
EOSINOPHIL NFR BLD AUTO: 2 % (ref 0–6)
ERYTHROCYTE [DISTWIDTH] IN BLOOD BY AUTOMATED COUNT: 14.6 % (ref 11.6–15.1)
GFR SERPL CREATININE-BSD FRML MDRD: 89 ML/MIN/1.73SQ M
GLUCOSE SERPL-MCNC: 95 MG/DL (ref 65–140)
HCT VFR BLD AUTO: 34.7 % (ref 34.8–46.1)
HGB BLD-MCNC: 11 G/DL (ref 11.5–15.4)
IMM GRANULOCYTES # BLD AUTO: 0.02 THOUSAND/UL (ref 0–0.2)
IMM GRANULOCYTES NFR BLD AUTO: 0 % (ref 0–2)
LYMPHOCYTES # BLD AUTO: 3.89 THOUSANDS/ÂΜL (ref 0.6–4.47)
LYMPHOCYTES NFR BLD AUTO: 45 % (ref 14–44)
MAGNESIUM SERPL-MCNC: 2 MG/DL (ref 1.9–2.7)
MCH RBC QN AUTO: 26.4 PG (ref 26.8–34.3)
MCHC RBC AUTO-ENTMCNC: 31.7 G/DL (ref 31.4–37.4)
MCV RBC AUTO: 83 FL (ref 82–98)
MONOCYTES # BLD AUTO: 0.67 THOUSAND/ÂΜL (ref 0.17–1.22)
MONOCYTES NFR BLD AUTO: 8 % (ref 4–12)
NEUTROPHILS # BLD AUTO: 3.94 THOUSANDS/ÂΜL (ref 1.85–7.62)
NEUTS SEG NFR BLD AUTO: 45 % (ref 43–75)
NRBC BLD AUTO-RTO: 0 /100 WBCS
PLATELET # BLD AUTO: 274 THOUSANDS/UL (ref 149–390)
PMV BLD AUTO: 9.9 FL (ref 8.9–12.7)
POTASSIUM SERPL-SCNC: 4.2 MMOL/L (ref 3.5–5.3)
RBC # BLD AUTO: 4.16 MILLION/UL (ref 3.81–5.12)
SODIUM SERPL-SCNC: 136 MMOL/L (ref 135–147)
WBC # BLD AUTO: 8.68 THOUSAND/UL (ref 4.31–10.16)

## 2025-06-01 PROCEDURE — 99232 SBSQ HOSP IP/OBS MODERATE 35: CPT | Performed by: INTERNAL MEDICINE

## 2025-06-01 PROCEDURE — 80048 BASIC METABOLIC PNL TOTAL CA: CPT | Performed by: INTERNAL MEDICINE

## 2025-06-01 PROCEDURE — 85025 COMPLETE CBC W/AUTO DIFF WBC: CPT | Performed by: INTERNAL MEDICINE

## 2025-06-01 PROCEDURE — 83735 ASSAY OF MAGNESIUM: CPT | Performed by: INTERNAL MEDICINE

## 2025-06-01 RX ADMIN — SODIUM CHLORIDE 4 MILLION UNITS: 9 INJECTION, SOLUTION INTRAVENOUS at 13:32

## 2025-06-01 RX ADMIN — SODIUM CHLORIDE 4 MILLION UNITS: 9 INJECTION, SOLUTION INTRAVENOUS at 04:14

## 2025-06-01 RX ADMIN — SODIUM CHLORIDE 4 MILLION UNITS: 9 INJECTION, SOLUTION INTRAVENOUS at 20:13

## 2025-06-01 RX ADMIN — AMLODIPINE BESYLATE 5 MG: 5 TABLET ORAL at 08:17

## 2025-06-01 RX ADMIN — PRAVASTATIN SODIUM 80 MG: 40 TABLET ORAL at 16:32

## 2025-06-01 RX ADMIN — SODIUM CHLORIDE 4 MILLION UNITS: 9 INJECTION, SOLUTION INTRAVENOUS at 00:07

## 2025-06-01 RX ADMIN — SODIUM CHLORIDE 4 MILLION UNITS: 9 INJECTION, SOLUTION INTRAVENOUS at 08:17

## 2025-06-01 RX ADMIN — HYDRALAZINE HYDROCHLORIDE 10 MG: 20 INJECTION INTRAMUSCULAR; INTRAVENOUS at 16:39

## 2025-06-01 RX ADMIN — SODIUM CHLORIDE 4 MILLION UNITS: 9 INJECTION, SOLUTION INTRAVENOUS at 16:32

## 2025-06-01 NOTE — ASSESSMENT & PLAN NOTE
"Ongoing issue of bilateral eyes over the last few years, although patient reports \"eye issues\" for a few decades now - follows / [facility] in [place], [place]  Evaluated by outpatient ophthalmology, after recently moving into the area, with positive syphilis screening  See plan for neurosyphilis above  "

## 2025-06-01 NOTE — ASSESSMENT & PLAN NOTE
Seen by outpatient Infectious disease, with recommendation to be hospitalized to initiate intravenous antibiotic for neurosyphilis, after lumbar puncture for CSF fluid confirmation (CSF VDRL and infectious panel ordered)  Of note, patient was administered a dose of IV penicillin in the ED prior to lumbar puncture - post-procedure,  continue IV PCN 4,000,000 units Q4H x 14 days per ID recommendation  Supportive care otherwise  CSF culture/gram stain negative - awaiting CSF VDRL  PICC line placed -> assess antibiotic toleration over the next few days (along with monitoring for Jarisch-Herxheimer reaction), with hopeful discharge home with VNA services afterwards

## 2025-06-01 NOTE — PROGRESS NOTES
"Progress Note - Hospitalist   Name: Emily Mejia 60 y.o. female I MRN: [id number]  Unit/Bed#: -01 I Date of Admission: 5/29/2025   Date of Service: 6/1/2025 I Hospital Day: 3    Assessment & Plan  Neurosyphilis  Seen by outpatient Infectious disease, with recommendation to be hospitalized to initiate intravenous antibiotic for neurosyphilis, after lumbar puncture for CSF fluid confirmation (CSF VDRL and infectious panel ordered)  Of note, patient was administered a dose of IV penicillin in the ED prior to lumbar puncture - post-procedure,  continue IV PCN 4,000,000 units Q4H x 14 days per ID recommendation  Supportive care otherwise  CSF culture/gram stain negative - awaiting CSF VDRL  PICC line placed -> assess antibiotic toleration over the next few days (along with monitoring for Jarisch-Herxheimer reaction), with hopeful discharge home with VNA services afterwards  Chronic uveitis  Ongoing issue of bilateral eyes over the last few years, although patient reports \"eye issues\" for a few decades now - follows [facility] in [place], [place]  Evaluated by outpatient ophthalmology, after recently moving into the area, with positive syphilis screening  See plan for neurosyphilis above  Hyperlipidemia  Continue statin  Chronic anemia  H/H stable  Elevated blood pressures  No prior history of hypertension, per patient  PRN IV Hydralazine on board  Initiated on daily/standing Norvasc with improvement in BP  Low-sodium diet  History of erythema nodosum  Monitor for recurrent with initiated neurosyphilis treatment  Outpatient follow-up      VTE Pharmacologic Prophylaxis: VTE Score: 3 Moderate Risk (Score 3-4) - Pharmacological DVT Prophylaxis Contraindicated. Sequential Compression Devices Ordered.    AM-PAC Basic Mobility:  Basic Mobility Inpatient Raw Score: 22  JH-HLM Goal: 7: Walk 25 feet or more  JH-HLM Achieved: 7: Walk 25 feet or more  JH-HLM Goal achieved. Continue to encourage appropriate " mobility.    Patient Centered Rounds:  I have performed bedside rounds and discussed plan of care with nursing today.  Discussions with Specialists or Other Care Team Provider:  see above assessments if applicable    Education and Discussions with Family / Patient:  Patient at bedside, who will self update family, as necessary    Time Spent for Care:  35 minutes. More than 50% of total time spent on counseling and coordination of care, on one or more of the following: performing physical exam; counseling and coordination of care, obtaining or reviewing history, documenting in the medical record, reviewing/ordering tests/medications/procedures, and communicating with other healthcare professionals.    Current Length of Stay: 3 day(s)  Current Patient Status: Inpatient   Certification Statement:  Patient will continue to require additional hospital stay due to assessments as noted above.    Code Status: Level 1 - Full Code      Subjective     Encountered earlier in the morning.  No new complaints at this time.  Denies headaches or nausea currently.      Objective     Vitals:   Temp (24hrs), Av.2 °F (36.8 °C), Min:97.2 °F (36.2 °C), Max:98.6 °F (37 °C)    Temp:  [97.2 °F (36.2 °C)-98.6 °F (37 °C)] 98.4 °F (36.9 °C)  HR:  [69-84] 84  Resp:  [18-20] 20  BP: (137-146)/(55-86) 141/55  SpO2:  [97 %-100 %] 98 %  Body mass index is 29.45 kg/m².     Input and Output Summary (last 24 hours):       Intake/Output Summary (Last 24 hours) at 2025 1357  Last data filed at 2025 1104  Gross per 24 hour   Intake 330 ml   Output 1900 ml   Net -1570 ml       Physical Exam:     GENERAL No acute distress at rest   HEAD   Normocephalic - atraumatic   EYES Chronic visual deficits present   MOUTH   Mucosa moist   NECK   Supple - full range of motion   CARDIAC Rate controlled   PULMONARY Nonlabored respirations at rest with clear breath sounds   ABDOMEN Nontender/nondistended   MUSCULOSKELETAL   Motor strength/range of motion  fairly intact   NEUROLOGIC   Alert/oriented at baseline   PSYCHIATRIC   Mood/affect less anxious         Labs & Recent Cultures:  Results from last 7 days   Lab Units 06/01/25  0455   WBC Thousand/uL 8.68   HEMOGLOBIN g/dL 11.0*   HEMATOCRIT % 34.7*   PLATELETS Thousands/uL 274   SEGS PCT % 45   LYMPHO PCT % 45*   MONO PCT % 8   EOS PCT % 2     Results from last 7 days   Lab Units 06/01/25  0455 05/31/25  0531 05/29/25  1038   POTASSIUM mmol/L 4.2   < > 4.1   CHLORIDE mmol/L 104   < > 106   CO2 mmol/L 26   < > 25   BUN mg/dL 16   < > 29*   CREATININE mg/dL 0.73   < > 0.76   CALCIUM mg/dL 9.5   < > 9.5   ALK PHOS U/L  --   --  72   ALT U/L  --   --  11   AST U/L  --   --  16    < > = values in this interval not displayed.     Results from last 7 days   Lab Units 05/29/25  1106   INR  0.94                     Results from last 7 days   Lab Units 05/29/25  1411   GRAM STAIN RESULT  1+ Polys  No No organisms seen  2+ Mononuclear Cells         Lines/Drains/Telemetry:  Invasive Devices       Peripherally Inserted Central Catheter Line  Duration             PICC Line 05/30/25 Right Basilic 1 day                    Last 24 Hours Medication List:   Current Facility-Administered Medications   Medication Dose Route Frequency Provider Last Rate    acetaminophen  650 mg Oral Q6H PRN Priscilla Venegas MD      amLODIPine  5 mg Oral Daily Priscilla Venegas MD      hydrALAZINE  10 mg Intravenous Q6H PRN Priscilla Venegas MD      ondansetron  4 mg Intravenous Q4H PRN Priscilla Venegas MD      penicillin G  4 Million Units Intravenous Q4H Priscilla Venegas MD 4 Million Units (06/01/25 1332)    pravastatin  80 mg Oral Daily With Dinner MD PRISCILLA Ramírez MD   Hospitalist - [facility] Internal Medicine        ** Please Note:  Documentation is constructed using a voice recognition dictation system.  An occasional wrong word/phrase or “sound-a-like” substitution may have been picked up by dictation device due to the inherent  limitations of voice recognition software.  Read the chart carefully and recognize, using reasonable context, where substitutions may have occurred.**

## 2025-06-01 NOTE — PLAN OF CARE
Problem: INFECTION - ADULT  Goal: Absence or prevention of progression during hospitalization  Description: INTERVENTIONS:  - Assess and monitor for signs and symptoms of infection  - Monitor lab/diagnostic results  - Monitor all insertion sites, i.e. indwelling lines, tubes, and drains  - Monitor endotracheal if appropriate and nasal secretions for changes in amount and color  - [place] appropriate cooling/warming therapies per order  - Administer medications as ordered  - Instruct and encourage patient and family to use good hand hygiene technique  - Identify and instruct in appropriate isolation precautions for identified infection/condition  Outcome: Progressing     Problem: PAIN - ADULT  Goal: Verbalizes/displays adequate comfort level or baseline comfort level  Description: Interventions:  - Encourage patient to monitor pain and request assistance  - Assess pain using appropriate pain scale  - Administer analgesics as ordered based on type and severity of pain and evaluate response  - Implement non-pharmacological measures as appropriate and evaluate response  - Consider cultural and social influences on pain and pain management  - Notify physician/advanced practitioner if interventions unsuccessful or patient reports new pain  - Educate patient/family on pain management process including their role and importance of  reporting pain   - Provide non-pharmacologic/complimentary pain relief interventions  Outcome: Progressing

## 2025-06-02 ENCOUNTER — TELEPHONE (OUTPATIENT)
Age: 60
End: 2025-06-02

## 2025-06-02 LAB
ANION GAP SERPL CALCULATED.3IONS-SCNC: 6 MMOL/L (ref 4–13)
BASOPHILS # BLD AUTO: 0.02 THOUSANDS/ÂΜL (ref 0–0.1)
BASOPHILS NFR BLD AUTO: 0 % (ref 0–1)
BUN SERPL-MCNC: 18 MG/DL (ref 5–25)
CALCIUM SERPL-MCNC: 9.7 MG/DL (ref 8.4–10.2)
CHLORIDE SERPL-SCNC: 103 MMOL/L (ref 96–108)
CO2 SERPL-SCNC: 26 MMOL/L (ref 21–32)
CREAT SERPL-MCNC: 0.74 MG/DL (ref 0.6–1.3)
EOSINOPHIL # BLD AUTO: 0.1 THOUSAND/ÂΜL (ref 0–0.61)
EOSINOPHIL NFR BLD AUTO: 1 % (ref 0–6)
ERYTHROCYTE [DISTWIDTH] IN BLOOD BY AUTOMATED COUNT: 14.5 % (ref 11.6–15.1)
GFR SERPL CREATININE-BSD FRML MDRD: 88 ML/MIN/1.73SQ M
GLUCOSE SERPL-MCNC: 93 MG/DL (ref 65–140)
HCT VFR BLD AUTO: 35.1 % (ref 34.8–46.1)
HGB BLD-MCNC: 11.3 G/DL (ref 11.5–15.4)
IMM GRANULOCYTES # BLD AUTO: 0.02 THOUSAND/UL (ref 0–0.2)
IMM GRANULOCYTES NFR BLD AUTO: 0 % (ref 0–2)
LYMPHOCYTES # BLD AUTO: 4.25 THOUSANDS/ÂΜL (ref 0.6–4.47)
LYMPHOCYTES NFR BLD AUTO: 45 % (ref 14–44)
MAGNESIUM SERPL-MCNC: 1.6 MG/DL (ref 1.9–2.7)
MCH RBC QN AUTO: 26.8 PG (ref 26.8–34.3)
MCHC RBC AUTO-ENTMCNC: 32.2 G/DL (ref 31.4–37.4)
MCV RBC AUTO: 83 FL (ref 82–98)
MONOCYTES # BLD AUTO: 0.69 THOUSAND/ÂΜL (ref 0.17–1.22)
MONOCYTES NFR BLD AUTO: 7 % (ref 4–12)
NEUTROPHILS # BLD AUTO: 4.37 THOUSANDS/ÂΜL (ref 1.85–7.62)
NEUTS SEG NFR BLD AUTO: 47 % (ref 43–75)
NRBC BLD AUTO-RTO: 0 /100 WBCS
PLATELET # BLD AUTO: 294 THOUSANDS/UL (ref 149–390)
PMV BLD AUTO: 9.8 FL (ref 8.9–12.7)
POTASSIUM SERPL-SCNC: 4.5 MMOL/L (ref 3.5–5.3)
RBC # BLD AUTO: 4.21 MILLION/UL (ref 3.81–5.12)
REAGIN AB CSF QL: NON REACTIVE
SODIUM SERPL-SCNC: 135 MMOL/L (ref 135–147)
WBC # BLD AUTO: 9.45 THOUSAND/UL (ref 4.31–10.16)

## 2025-06-02 PROCEDURE — G0545 PR INHERENT VISIT TO INPT: HCPCS | Performed by: INTERNAL MEDICINE

## 2025-06-02 PROCEDURE — 99233 SBSQ HOSP IP/OBS HIGH 50: CPT | Performed by: PHYSICIAN ASSISTANT

## 2025-06-02 PROCEDURE — 99232 SBSQ HOSP IP/OBS MODERATE 35: CPT | Performed by: INTERNAL MEDICINE

## 2025-06-02 PROCEDURE — 80048 BASIC METABOLIC PNL TOTAL CA: CPT | Performed by: INTERNAL MEDICINE

## 2025-06-02 PROCEDURE — 83735 ASSAY OF MAGNESIUM: CPT | Performed by: INTERNAL MEDICINE

## 2025-06-02 PROCEDURE — 85025 COMPLETE CBC W/AUTO DIFF WBC: CPT | Performed by: INTERNAL MEDICINE

## 2025-06-02 RX ORDER — AMLODIPINE BESYLATE 5 MG/1
5 TABLET ORAL DAILY
Qty: 30 TABLET | Refills: 0 | Status: SHIPPED | OUTPATIENT
Start: 2025-06-03

## 2025-06-02 RX ADMIN — PRAVASTATIN SODIUM 80 MG: 40 TABLET ORAL at 16:42

## 2025-06-02 RX ADMIN — SODIUM CHLORIDE 4 MILLION UNITS: 9 INJECTION, SOLUTION INTRAVENOUS at 22:00

## 2025-06-02 RX ADMIN — SODIUM CHLORIDE 4 MILLION UNITS: 9 INJECTION, SOLUTION INTRAVENOUS at 04:11

## 2025-06-02 RX ADMIN — SODIUM CHLORIDE 4 MILLION UNITS: 9 INJECTION, SOLUTION INTRAVENOUS at 16:30

## 2025-06-02 RX ADMIN — SODIUM CHLORIDE 4 MILLION UNITS: 9 INJECTION, SOLUTION INTRAVENOUS at 08:30

## 2025-06-02 RX ADMIN — SODIUM CHLORIDE 4 MILLION UNITS: 9 INJECTION, SOLUTION INTRAVENOUS at 12:18

## 2025-06-02 RX ADMIN — SODIUM CHLORIDE 4 MILLION UNITS: 9 INJECTION, SOLUTION INTRAVENOUS at 00:42

## 2025-06-02 RX ADMIN — AMLODIPINE BESYLATE 5 MG: 5 TABLET ORAL at 08:30

## 2025-06-02 NOTE — CASE MANAGEMENT
Case Management Discharge Planning Note    Patient name Emily Mejia  Location /-01 MRN [id number]  : 1965 Date 2025       Current Admission Date: 2025  Current Admission Diagnosis:Neurosyphilis   Patient Active Problem List    Diagnosis Date Noted    Chronic uveitis 2025    Hyperlipidemia 2025    Chronic anemia 2025    Elevated blood pressures 2025    History of erythema nodosum 2025    Mixed hyperlipidemia 2025    Neurosyphilis 2025    Erythema nodosum 10/08/2024      LOS (days): 4  Geometric Mean LOS (GMLOS) (days): 3.8  Days to GMLOS:-0.4     OBJECTIVE:  Risk of Unplanned Readmission Score: 5.25         Current admission status: Inpatient   Preferred Pharmacy:   [id number] [place] - [address]  [address]  [place] [address]  Phone: [phone] Fax: [phone]    Primary Care Provider: Sarah Hull PA-C    Primary Insurance: MEDICARE  Secondary Insurance: [facility]    DISCHARGE DETAILS:  As per SLIM the pt will need to get her IVABX during the night and can be DC at 0700 in the am.  TC to [facility] at [phone].  [facility] will transport in the am.  tyron Najera and Jessy are all aware.

## 2025-06-02 NOTE — PROGRESS NOTES
Pastoral Care Progress Note  CH initiated support visit to pt. Pt received CH upright in bed, no family present.    Pt shared that she hopes to discharge soon.  CH provided empathetic listening and support.           06/02/25 1400   Clinical Encounter Type   Visited With Patient

## 2025-06-02 NOTE — CASE MANAGEMENT
Case Management Discharge Planning Note    Patient name Emily Mejia  Location /-01 MRN [id number]  : 1965 Date 2025       Current Admission Date: 2025  Current Admission Diagnosis:Neurosyphilis   Patient Active Problem List    Diagnosis Date Noted    Chronic uveitis 2025    Hyperlipidemia 2025    Chronic anemia 2025    Elevated blood pressures 2025    History of erythema nodosum 2025    Mixed hyperlipidemia 2025    Neurosyphilis 2025    Erythema nodosum 10/08/2024      LOS (days): 4  Geometric Mean LOS (GMLOS) (days): 3.8  Days to GMLOS:-0.1     OBJECTIVE:  Risk of Unplanned Readmission Score: 5.18         Current admission status: Inpatient   Preferred Pharmacy:   [id number] [place] - [address]  [address]  [place] [address]  Phone: [phone] Fax: [phone]    Primary Care Provider: Sarah Hull PA-C    Primary Insurance: MEDICARE  Secondary Insurance: [facility]    DISCHARGE DETAILS:  Received notification from SUSNANE LIVINGSTON will have to write the scrip for IVABX.  Notified SLIM of same.  [facility] has been reserved and is aware today will attempt to have the  IAVBX delivered today.  [facility] is anticipatign SOC Tues 6/3/25.          Requested Home Health Care         Home Health Agency Name:: [facility]

## 2025-06-02 NOTE — ASSESSMENT & PLAN NOTE
"Ongoing issue of bilateral eyes over the last few years, although patient reports \"eye issues\" for a few decades now - follows / [facility] in [place], [place]  Evaluated by outpatient ophthalmology, after recently moving into the area, with positive syphilis screening  See plan for neurosyphilis above  "

## 2025-06-02 NOTE — PLAN OF CARE
Problem: INFECTION - ADULT  Goal: Absence or prevention of progression during hospitalization  Description: INTERVENTIONS:  - Assess and monitor for signs and symptoms of infection  - Monitor lab/diagnostic results  - Monitor all insertion sites, i.e. indwelling lines, tubes, and drains  - Monitor endotracheal if appropriate and nasal secretions for changes in amount and color  - [place] appropriate cooling/warming therapies per order  - Administer medications as ordered  - Instruct and encourage patient and family to use good hand hygiene technique  - Identify and instruct in appropriate isolation precautions for identified infection/condition  Outcome: Progressing

## 2025-06-02 NOTE — PROGRESS NOTES
Progress Note - Infectious Disease   Name: Emily Mejia 60 y.o. female I MRN: [id number]  Unit/Bed#: -01 I Date of Admission: 5/29/2025   Date of Service: 6/2/2025 I Hospital Day: 4   Assessment & Plan  Neurosyphilis  Work up initiated by patient's new ophthalmologist in setting of chronic worsening B/L uveitis. Patient with positive syphilis screen, positive treponema screen antibody, and confirmatory second treponema antibody also positive. She denies prior syphilis history and treatment. Given ocular manifestations, neurosyphilis likely. LP performed yesterday to assess for level of CSF inflammation. Tap was traumatic. CSF fluid WBC = 7, 30% neutrophils and 63% lymphs.  CSF protein = 54. CSF glucose = 58. CSF VRDL is pending. Gram stain and formal culture without growth. Patient received a dose of Pencillin G prior to the LP and then started q4 hour dosing post procedure. Patient is tolerating the penicillin without difficulty. No evidence of Jarisch-Herxheimer reaction at this time. PICC has been placed. Patient should complete 14 days total treatment.   -continue IV Penicillin G, 4 million units q4 hours through 6/12/2025 for 14 days total treatment  -weekly CBCD and BMP while on IV antibiotic  -follow up final CSF VDRL fluid study  -monitor vitals  -serial neuro exams  -PICC to be removed after final dose of IV antibiotic on 6/12/2025  -patient to follow up in the outpatient ID office after discharge, appointment information placed in the discharge planning  Chronic uveitis  With R eye nearly blind. Likely secondary to neurosyphilis given recent positive syphilis screening. Patient follows outpatient with ophthalmology   -antibiotic as above  -resume outpatient ophthalmology follow up after discharge  History of erythema nodosum  Noted in 9/2024. Unclear if related to syphilis above.   -serial skin exams  -monitor for recurring rash     The patient is stable for DC from ID standpoint once home IV  antibiotic/PICC care has been coordinated.     Above plan was discussed in detail with patient over TV kit.  Above plan was discussed in detail with SLIM who agrees with plan for ongoing IV antibiotic treatment.     Subjective:  Patient reports she's feeling well today, is hopeful to go home tonight. She has noticed no change in her vision. She denies new rashes to the skin. She has no concerns with her new PICC line. She has no fever, chills, sweats, shakes; no nausea, vomiting, abdominal pain, diarrhea, or dysuria; no cough, shortness of breath, or chest pain. No new symptoms.    Antibiotics:  Penicillin G 5  Antibiotics 5    Physical Exam     Temp:  [98 °F (36.7 °C)-98.6 °F (37 °C)] 98.4 °F (36.9 °C)  HR:  [] 79  Resp:  [17-19] 17  BP: (135-168)/(55-86) 147/80  SpO2:  [95 %-100 %] 97 %  Temp (24hrs), Av.4 °F (36.9 °C), Min:98 °F (36.7 °C), Max:98.6 °F (37 °C)  Current: Temperature: 98.4 °F (36.9 °C)    Intake/Output Summary (Last 24 hours) at 2025 0818  Last data filed at 2025 0441  Gross per 24 hour   Intake 492 ml   Output 1500 ml   Net -1008 ml       Physical exam findings reported by bedside and primary medical team staff, also observed on TV kit:  General Appearance:  Alert, interactive, nontoxic, no acute distress. She appears comfortable supine in bed.   Mouth/Throat: Oropharynx moist without lesions. Dentures.   Lungs:   Decreased to auscultation bilaterally; no wheezes, rhonchi or rales; respirations unlabored on room air   Heart:  RRR; no murmur, rub or gallop.   Abdomen:   Soft, non-tender, non-distended, positive bowel sounds.     Extremities: No clubbing or cyanosis, no edema. RUE PICC intact.   Skin: No new rashes noted on exposed skin.     Invasive Devices:   PICC Line 25 Right Basilic (Active)   Reasons to continue PICC Long term antibiotics 25 0046   Goal for Removal N/A- Discharging with PICC for IV ABX/medications 25 0046   Line Necessity Reviewed Yes,  reviewed with provider 06/02/25 0046   Site Assessment WDL;Clean;Dry;Intact 06/02/25 0046   #1 Lumen Color/Status Purple lumen;Flushed;Passive disinfecting cap present;Infusing;Blood return noted 06/02/25 0046   Dressing Type Securing device;Chlorhexidine dressing 06/02/25 0046   Dressing Status Dry;Clean;Intact 06/02/25 0046   Dressing Change Due 06/06/25 06/02/25 0046   CLABSI Time-out performed for Positive Blood Culture No 06/01/25 1400       Labs, Imaging, & Other Studies   I have personally reviewed pertinent labs.    Results from last 7 days   Lab Units 06/02/25  0451 06/01/25  0455 05/31/25  0444   WBC Thousand/uL 9.45 8.68 8.09   HEMOGLOBIN g/dL 11.3* 11.0* 9.7*   PLATELETS Thousands/uL 294 274 256     Results from last 7 days   Lab Units 06/02/25  0451 05/31/25  0531 05/29/25  1038   POTASSIUM mmol/L 4.5   < > 4.1   CHLORIDE mmol/L 103   < > 106   CO2 mmol/L 26   < > 25   BUN mg/dL 18   < > 29*   CREATININE mg/dL 0.74   < > 0.76   EGFR ml/min/1.73sq m 88   < > 85   CALCIUM mg/dL 9.7   < > 9.5   AST U/L  --   --  16   ALT U/L  --   --  11   ALK PHOS U/L  --   --  72    < > = values in this interval not displayed.     Results from last 7 days   Lab Units 05/29/25  1411   GRAM STAIN RESULT  1+ Polys  No No organisms seen  2+ Mononuclear Cells

## 2025-06-02 NOTE — PROGRESS NOTES
"Progress Note - Hospitalist   Name: Emily Mejia 60 y.o. female I MRN: [id number]  Unit/Bed#: -01 I Date of Admission: 5/29/2025   Date of Service: 6/2/2025 I Hospital Day: 4    Assessment & Plan  Neurosyphilis  Seen by outpatient Infectious disease, with recommendation to be hospitalized to initiate intravenous antibiotic for neurosyphilis, after lumbar puncture for CSF fluid confirmation (CSF VDRL and infectious panel ordered)  Of note, patient was administered a dose of IV penicillin in the ED prior to lumbar puncture - post-procedure,  continue IV PCN 4,000,000 units Q4H x 14 days per ID recommendation  Supportive care otherwise  CSF culture/gram stain negative - awaiting CSF VDRL  PICC line placed -> patient tolerated antibiotic over the weekend (no Jarisch-Herxheimer reaction), plan for discharge home on IV penicillin for total of 14 days  ID follow-up, no indication for repeat LP following completion of treatment  Recommend ophthalmology follow-up as well  Chronic uveitis  Ongoing issue of bilateral eyes over the last few years, although patient reports \"eye issues\" for a few decades now - follows [facility] in [place], [place]  Evaluated by outpatient ophthalmology, after recently moving into the area, with positive syphilis screening  See plan for neurosyphilis above  Hyperlipidemia  Continue statin  Chronic anemia  H/H stable  Elevated blood pressures  No prior history of hypertension, per patient  PRN IV Hydralazine on board  Initiated on daily/standing Norvasc with improvement in BP  Low-sodium diet  History of erythema nodosum  Monitor for recurrent with initiated neurosyphilis treatment  Outpatient follow-up    VTE Pharmacologic Prophylaxis: VTE Score: 3 Low Risk (Score 0-2) - Encourage Ambulation.    Mobility:   Basic Mobility Inpatient Raw Score: 22  JH-HLM Goal: 7: Walk 25 feet or more  JH-HLM Achieved: 7: Walk 25 feet or more  JH-HLM Goal achieved. Continue to encourage " appropriate mobility.    Patient Centered Rounds: I performed bedside rounds with nursing staff today.   Discussions with Specialists or Other Care Team Provider: ID, case management    Education and Discussions with Family / Patient: Patient declined call to .     Current Length of Stay: 4 day(s)  Current Patient Status: Inpatient   Certification Statement: The patient will continue to require additional inpatient hospital stay due to coordinating IV abx  Discharge Plan: Anticipate discharge tomorrow to home with home services.    Code Status: Level 1 - Full Code    Subjective   Patient offers no complaints and feels well.  She says she is tolerating the antibiotic without any symptoms.  Denies fever or chills, weakness, lightheadedness, headache, dizziness.  Has been ambulating without issues.  All other review of systems negative.  She feels ready to go home.    Objective :  Temp:  [97.7 °F (36.5 °C)-98.6 °F (37 °C)] 97.7 °F (36.5 °C)  HR:  [] 94  BP: (135-168)/(77-86) 144/77  Resp:  [17-19] 18  SpO2:  [97 %-100 %] 99 %  O2 Device: None (Room air)    Body mass index is 29.45 kg/m².     Input and Output Summary (last 24 hours):     Intake/Output Summary (Last 24 hours) at 6/2/2025 1854  Last data filed at 6/2/2025 1801  Gross per 24 hour   Intake 1570 ml   Output 600 ml   Net 970 ml       Physical Exam  Vitals and nursing note reviewed.   Constitutional:       General: She is not in acute distress.     Appearance: Normal appearance. She is normal weight. She is not ill-appearing or toxic-appearing.   HENT:      Head: Normocephalic and atraumatic.      Right Ear: External ear normal.      Left Ear: External ear normal.      Nose: Nose normal.      Mouth/Throat:      Mouth: Mucous membranes are moist.      Pharynx: Oropharynx is clear.     Eyes:      Comments: Visual impairment at baseline     Cardiovascular:      Rate and Rhythm: Normal rate and regular rhythm.      Pulses: Normal pulses.       Heart sounds: Normal heart sounds. No murmur heard.     No gallop.   Pulmonary:      Effort: Pulmonary effort is normal. No respiratory distress.      Breath sounds: Normal breath sounds. No stridor. No wheezing, rhonchi or rales.   Abdominal:      General: Abdomen is flat. There is no distension.      Palpations: Abdomen is soft. There is no mass.      Tenderness: There is no abdominal tenderness. There is no guarding or rebound.      Hernia: No hernia is present.     Musculoskeletal:      Right lower leg: No edema.      Left lower leg: No edema.     Skin:     General: Skin is warm and dry.     Neurological:      Mental Status: She is alert. Mental status is at baseline.      Sensory: No sensory deficit.      Motor: No weakness.     Psychiatric:         Mood and Affect: Mood normal.           Lines/Drains:  Lines/Drains/Airways       Active Status       Name Placement date Placement time Site Days    PICC Line 05/30/25 Right Basilic 05/30/25  1606  Basilic  3                    Central Line:  Goal for removal: N/A - Discharging with PICC for IV ABX/medications               Lab Results: I have reviewed the following results:   Results from last 7 days   Lab Units 06/02/25  0451   WBC Thousand/uL 9.45   HEMOGLOBIN g/dL 11.3*   HEMATOCRIT % 35.1   PLATELETS Thousands/uL 294   SEGS PCT % 47   LYMPHO PCT % 45*   MONO PCT % 7   EOS PCT % 1     Results from last 7 days   Lab Units 06/02/25  0451 05/31/25  0531 05/29/25  1038   SODIUM mmol/L 135   < > 140   POTASSIUM mmol/L 4.5   < > 4.1   CHLORIDE mmol/L 103   < > 106   CO2 mmol/L 26   < > 25   BUN mg/dL 18   < > 29*   CREATININE mg/dL 0.74   < > 0.76   ANION GAP mmol/L 6   < > 9   CALCIUM mg/dL 9.7   < > 9.5   ALBUMIN g/dL  --   --  4.1   TOTAL BILIRUBIN mg/dL  --   --  0.25   ALK PHOS U/L  --   --  72   ALT U/L  --   --  11   AST U/L  --   --  16   GLUCOSE RANDOM mg/dL 93   < > 93    < > = values in this interval not displayed.     Results from last 7 days   Lab Units  05/29/25  1106   INR  0.94                   Recent Cultures (last 7 days):   Results from last 7 days   Lab Units 05/29/25  1411   GRAM STAIN RESULT  1+ Polys  No No organisms seen  2+ Mononuclear Cells       Imaging Results Review: No pertinent imaging studies reviewed.  Other Study Results Review: No additional pertinent studies reviewed.    Last 24 Hours Medication List:     Current Facility-Administered Medications:     acetaminophen (TYLENOL) tablet 650 mg, Q6H PRN    amLODIPine (NORVASC) tablet 5 mg, Daily    hydrALAZINE (APRESOLINE) injection 10 mg, Q6H PRN    ondansetron (ZOFRAN) injection 4 mg, Q4H PRN    penicillin G (PFIZERPEN-G) 4 Million Units in sodium chloride 0.9 % 50 mL IVPB, Q4H, Last Rate: 100 mL/hr at 06/02/25 1801    pravastatin (PRAVACHOL) tablet 80 mg, Daily With Dinner    Administrative Statements   Today, Patient Was Seen By: Ivy Arnold PA-C  I have spent a total time of 45 minutes in caring for this patient on the day of the visit/encounter including Diagnostic results, Prognosis, Instructions for management, Impressions, Counseling / Coordination of care, Documenting in the medical record, Reviewing/placing orders in the medical record (including tests, medications, and/or procedures), Obtaining or reviewing history  , and Communicating with other healthcare professionals .    **Please Note: This note may have been constructed using a voice recognition system.**

## 2025-06-02 NOTE — DISCHARGE SUMMARY
"Discharge Summary - Hospitalist   Name: Emily Mejia 60 y.o. female I MRN: [id number]  Unit/Bed#: -01 I Date of Admission: 5/29/2025   Date of Service: 6/3/2025 I Hospital Day: 5     Assessment & Plan  Neurosyphilis  Patient seen by ophthalmology for chronic worsening B/L uveitis and was positive for syphilis screen, positive treponema screen antibody, and confirmatory second treponema antibody also positive.   Seen by outpatient Infectious disease, with recommendation to be hospitalized to initiate IV antibiotic for neurosyphilis, after lumbar puncture for CSF fluid confirmation   ID consultation appreciated  Continue IV PCN 4,000,000 units Q4H x 14 days through to 6/12/2025  No indication for repeat LP following completion of treatment  CSF culture/gram stain negative - awaiting CSF VDRL  PICC line placed -> patient tolerated antibiotic over the weekend (no Jarisch-Herxheimer reaction)  Outpatient follow-up with ID    Chronic uveitis  Ongoing issue of bilateral eyes over the last few years, although patient reports \"eye issues\" for a few decades now - follows [facility] in [place], [place]  Evaluated by outpatient ophthalmology, after recently moving into the area, with positive syphilis screening  See plan for neurosyphilis above  Hyperlipidemia  Continue statin  Chronic anemia  H/H stable  Elevated blood pressures  No prior history of hypertension, per patient  Initiated on Norvasc 5 mg po daily, continued on discharge.  Blood pressure improved  History of erythema nodosum  Monitor for recurrent with initiated neurosyphilis treatment  Outpatient follow-up     Medical Problems       Resolved Problems  Date Reviewed: 6/2/2025   None       Discharging Physician / Practitioner: Zeus Guevara PA-C  PCP: Sarah Hull PA-C  Admission Date:   Admission Orders (From admission, onward)       Ordered        05/29/25 1051  INPATIENT ADMISSION  Once                          Discharge Date: " 06/03/25    Next Steps for Physician/AP Assuming Care:  ID follow up  Weekly CBCD and Creatinine while on IV abx for ID to follow up    Test Results Pending at Discharge (will require follow up):  none    Medication Changes for Discharge & Rationale:   IV PCN x6 weeks  Start amlodipine 5 mg daily  See after visit summary for reconciled discharge medications provided to patient and/or family.     Consultations During Hospital Stay:  ID    Procedures Performed:   LP 5/29  PICC placement 5/30    Significant Findings / Test Results:   Hypomagnesemic with mag of 1.5 on admission  CSF fluid: WBCs 7, 30% neutrophils and 63% lymphs, CSF protein 54, CSF glucose 58  CSF E DR L non reactive  CSF culture without growth    Incidental Findings:   none     Hospital Course:   Emily Mejia is a 60 y.o. female patient with past medical history of hyperlipidemia, anemia who originally presented to the hospital on 5/29/2025 due to concerns for neurosyphilis.  Patient was referred to ID by ophthalmology due to worsening visual acuity and unimproving bilateral uveitis.  ID referred her to the hospital for immediate workup of neurosyphilis.  She underwent lumbar puncture with IR for CSF fluid confirmation.  Upon admission, vital signs were stable with no SIRS criteria met.  Remainder of her admission workup was unrevealing.  She was started on IV penicillin immediately following lumbar puncture.  ID was consulted and recommended continued IV penicillin and monitoring patient's tolerance of treatment including  Jarisch- Herxheimer reaction.  Patient did in fact tolerate penicillin and experienced no side effects.  A PICC line was placed on 5/30.  CSF culture was without growth and VDRL was negative however ID suggest this can have poor sensitivity and recommended completing empiric treatment course for neurosyphilis.  They recommend 14 days total but do not recommend repeat LP after completion of treatment given that CSF results are only  "mildly inflammatory as mentioned.  She will need continued ophthalmology follow-up as well.  She was arranged for IV antibiotics and home care and medically stable for discharge home on 6/3/2025.    Please see above list of diagnoses and related plan for additional information.     Discharge Day Visit / Exam:   Subjective:    Vitals: Blood Pressure: 139/78 (06/03/25 0400)  Pulse: 88 (06/03/25 0400)  Temperature: 98.5 °F (36.9 °C) (06/03/25 0400)  Temp Source: Oral (06/03/25 0400)  Respirations: 18 (06/03/25 0400)  Height: 5' 3\" (160 cm) (05/31/25 1900)  Weight - Scale: 75.4 kg (166 lb 3.6 oz) (05/29/25 1122)  SpO2: 99 % (06/03/25 0400)  Physical Exam Patient left prior to being seen the morning of discharge    Discussion with Family: Discharge planning was discussed with the patient and her family the day prior.     Discharge instructions/Information to patient and family:   See after visit summary for information provided to patient and family.      Provisions for Follow-Up Care:  See after visit summary for information related to follow-up care and any pertinent home health orders.      Mobility at time of Discharge:   Basic Mobility Inpatient Raw Score: 22  JH-HLM Goal: 7: Walk 25 feet or more  JH-HLM Achieved: 7: Walk 25 feet or more  HLM Goal achieved. Continue to encourage appropriate mobility.     Disposition:   Home with VNA Services (Reminder: Complete face to face encounter)    Planned Readmission: no    Administrative Statements   Discharge Statement:  I have spent a total time of 25 minutes in caring for this patient on the day of the visit/encounter. >30 minutes of time was spent on: Documenting in the medical record.    **Please Note: This note may have been constructed using a voice recognition system**  "

## 2025-06-02 NOTE — PLAN OF CARE
Problem: INFECTION - ADULT  Goal: Absence or prevention of progression during hospitalization  Description: INTERVENTIONS:  - Assess and monitor for signs and symptoms of infection  - Monitor lab/diagnostic results  - Monitor all insertion sites, i.e. indwelling lines, tubes, and drains  - Monitor endotracheal if appropriate and nasal secretions for changes in amount and color  - [place] appropriate cooling/warming therapies per order  - Administer medications as ordered  - Instruct and encourage patient and family to use good hand hygiene technique  - Identify and instruct in appropriate isolation precautions for identified infection/condition  Outcome: Progressing       Problem: DISCHARGE PLANNING  Goal: Discharge to home or other facility with appropriate resources  Description: INTERVENTIONS:  - Identify barriers to discharge w/patient and caregiver  - Arrange for needed discharge resources and transportation as appropriate  - Identify discharge learning needs (meds, wound care, etc.)  - Arrange for interpretive services to assist at discharge as needed  - Refer to Case Management Department for coordinating discharge planning if the patient needs post-hospital services based on physician/advanced practitioner order or complex needs related to functional status, cognitive ability, or social support system  Outcome: Progressing

## 2025-06-02 NOTE — CASE MANAGEMENT
Case Management Discharge Planning Note    Patient name Emily Mejia  Location /-01 MRN [id number]  : 1965 Date 2025       Current Admission Date: 2025  Current Admission Diagnosis:Neurosyphilis   Patient Active Problem List    Diagnosis Date Noted    Chronic uveitis 2025    Hyperlipidemia 2025    Chronic anemia 2025    Elevated blood pressures 2025    History of erythema nodosum 2025    Mixed hyperlipidemia 2025    Neurosyphilis 2025    Erythema nodosum 10/08/2024      LOS (days): 4  Geometric Mean LOS (GMLOS) (days): 3.8  Days to GMLOS:-0.4     OBJECTIVE:  Risk of Unplanned Readmission Score: 5.25         Current admission status: Inpatient   Preferred Pharmacy:   [id number] [place] - [address]  [address]  [place] [address]  Phone: [phone] Fax: [phone]    Primary Care Provider: Sarah Hull PA-C    Primary Insurance: MEDICARE  Secondary Insurance: [facility]    DISCHARGE DETAILS:  Spoke to Mili from [facility] who states the delivery for IVABX will not be there until 8-9 pm delivery to the house.  Notified Dania who will do an am visit for the RN to connect the IVABX to pump.  Notified the pt, Kerry CARPIO.

## 2025-06-02 NOTE — ASSESSMENT & PLAN NOTE
No prior history of hypertension, per patient  Initiated on Norvasc 5 mg po daily, continued on discharge.  Blood pressure improved

## 2025-06-02 NOTE — ASSESSMENT & PLAN NOTE
Work up initiated by patient's new ophthalmologist in setting of chronic worsening B/L uveitis. Patient with positive syphilis screen, positive treponema screen antibody, and confirmatory second treponema antibody also positive. She denies prior syphilis history and treatment. Given ocular manifestations, neurosyphilis likely. LP performed yesterday to assess for level of CSF inflammation. Tap was traumatic. CSF fluid WBC = 7, 30% neutrophils and 63% lymphs.  CSF protein = 54. CSF glucose = 58. CSF VRDL is pending. Gram stain and formal culture without growth. Patient received a dose of Pencillin G prior to the LP and then started q4 hour dosing post procedure. Patient is tolerating the penicillin without difficulty. No evidence of Jarisch-Herxheimer reaction at this time. PICC has been placed. Patient should complete 14 days total treatment.   -continue IV Penicillin G, 4 million units q4 hours through 6/12/2025 for 14 days total treatment  -weekly CBCD and BMP while on IV antibiotic  -follow up final CSF VDRL fluid study  -monitor vitals  -serial neuro exams  -PICC to be removed after final dose of IV antibiotic on 6/12/2025  -patient to follow up in the outpatient ID office after discharge, appointment information placed in the discharge planning

## 2025-06-02 NOTE — ASSESSMENT & PLAN NOTE
Seen by outpatient Infectious disease, with recommendation to be hospitalized to initiate intravenous antibiotic for neurosyphilis, after lumbar puncture for CSF fluid confirmation (CSF VDRL and infectious panel ordered)  Of note, patient was administered a dose of IV penicillin in the ED prior to lumbar puncture - post-procedure,  continue IV PCN 4,000,000 units Q4H x 14 days per ID recommendation  Supportive care otherwise  CSF culture/gram stain negative - awaiting CSF VDRL  PICC line placed -> patient tolerated antibiotic over the weekend (no Jarisch-Herxheimer reaction), plan for discharge home on IV penicillin for total of 14 days  ID follow-up, no indication for repeat LP following completion of treatment  Recommend ophthalmology follow-up as well

## 2025-06-02 NOTE — TELEPHONE ENCOUNTER
[facility]    Advised as per PAULO Phillip 06/02/25 1531  notation. Jordana will contact hospital provider for clarification regarding dosing.

## 2025-06-02 NOTE — CASE MANAGEMENT
Case Management Discharge Planning Note    Patient name Emily Mejia  Location /-01 MRN [id number]  : 1965 Date 2025       Current Admission Date: 2025  Current Admission Diagnosis:Neurosyphilis   Patient Active Problem List    Diagnosis Date Noted    Chronic uveitis 2025    Hyperlipidemia 2025    Chronic anemia 2025    Elevated blood pressures 2025    History of erythema nodosum 2025    Mixed hyperlipidemia 2025    Neurosyphilis 2025    Erythema nodosum 10/08/2024      LOS (days): 4  Geometric Mean LOS (GMLOS) (days): 3.8  Days to GMLOS:-0.2     OBJECTIVE:  Risk of Unplanned Readmission Score: 5.18      Current admission status: Inpatient   Preferred Pharmacy:   [id number] [place] - [address]  [address]  [place] [address]  Phone: [phone] Fax: [phone]    Primary Care Provider: Sarah Hull PA-C    Primary Insurance: MEDICARE  Secondary Insurance: [facility]    DISCHARGE DETAILS:  Received notification from [facility] they are unable to provide service to pt for IVABX.  Sent refferral to [facility] via [facility].

## 2025-06-02 NOTE — CASE MANAGEMENT
Case Management Discharge Planning Note    Patient name Emily Mejia  Location /-01 MRN [id number]  : 1965 Date 2025       Current Admission Date: 2025  Current Admission Diagnosis:Neurosyphilis   Patient Active Problem List    Diagnosis Date Noted    Chronic uveitis 2025    Hyperlipidemia 2025    Chronic anemia 2025    Elevated blood pressures 2025    History of erythema nodosum 2025    Mixed hyperlipidemia 2025    Neurosyphilis 2025    Erythema nodosum 10/08/2024      LOS (days): 4  Geometric Mean LOS (GMLOS) (days): 3.8  Days to GMLOS:-0.1     OBJECTIVE:  Risk of Unplanned Readmission Score: 5.18         Current admission status: Inpatient   Preferred Pharmacy:   [id number] [place] - [address]  [address]  [place] [address]  Phone: [phone] Fax: [phone]    Primary Care Provider: Sarah Hull PA-C    Primary Insurance: MEDICARE  Secondary Insurance: [facility]    DISCHARGE DETAILS:  Received IVABX order,CAD pump order and BW order.  Downloaded to [facility].  Will notify the [facility] agency when the IVABX will be delivered.  Notified pt of same.    Requested Home Health Care         Home Health Agency Name:: [facility]

## 2025-06-02 NOTE — DISCHARGE INSTR - AVS FIRST PAGE
PICC TO BE REMOVED BY HOME HEALTH RN AFTER FINAL DOSE OF IV ANTIBITIC ON 6/12/2025.  Weekly CBCD and BMP while on IV antibiotic.  You will be set up with an appointment in the outpatient Infectious Disease office. It is important for you to keep this appointment in order for us to monitor you while you are on IV antibiotics.  This will include evaluating your lab work, examining your PICC line, and making sure you are not having toxicities or side effects of the medication(s) you are receiving.   ------------------------------------------------------------------------------------------------------------

## 2025-06-02 NOTE — ASSESSMENT & PLAN NOTE
Patient seen by ophthalmology for chronic worsening B/L uveitis and was positive for syphilis screen, positive treponema screen antibody, and confirmatory second treponema antibody also positive.   Seen by outpatient Infectious disease, with recommendation to be hospitalized to initiate IV antibiotic for neurosyphilis, after lumbar puncture for CSF fluid confirmation   ID consultation appreciated  Continue IV PCN 4,000,000 units Q4H x 14 days through to 6/12/2025  No indication for repeat LP following completion of treatment  CSF culture/gram stain negative - awaiting CSF VDRL  PICC line placed -> patient tolerated antibiotic over the weekend (no Jarisch-Herxheimer reaction)  Outpatient follow-up with ID

## 2025-06-02 NOTE — ASSESSMENT & PLAN NOTE
"Ongoing issue of bilateral eyes over the last few years, although patient reports \"eye issues\" for a few decades now - follows / [facility] in [place], [place]  Evaluated by outpatient ophthalmology, after recently moving into the area, with positive syphilis screening  See plan for neurosyphilis above  "

## 2025-06-02 NOTE — TELEPHONE ENCOUNTER
Jordana calls from [facility] and states that they are wondering if we can pt change dosing to 24 million continuously  over 24 hrs.

## 2025-06-03 ENCOUNTER — TELEPHONE (OUTPATIENT)
Age: 60
End: 2025-06-03

## 2025-06-03 ENCOUNTER — TRANSITIONAL CARE MANAGEMENT (OUTPATIENT)
Dept: FAMILY MEDICINE CLINIC | Facility: CLINIC | Age: 60
End: 2025-06-03

## 2025-06-03 ENCOUNTER — TELEPHONE (OUTPATIENT)
Dept: INFECTIOUS DISEASES | Facility: CLINIC | Age: 60
End: 2025-06-03

## 2025-06-03 VITALS
HEIGHT: 63 IN | DIASTOLIC BLOOD PRESSURE: 78 MMHG | WEIGHT: 166.23 LBS | HEART RATE: 88 BPM | OXYGEN SATURATION: 99 % | SYSTOLIC BLOOD PRESSURE: 139 MMHG | BODY MASS INDEX: 29.45 KG/M2 | TEMPERATURE: 98.5 F | RESPIRATION RATE: 18 BRPM

## 2025-06-03 DIAGNOSIS — Z79.2 LONG TERM (CURRENT) USE OF ANTIBIOTICS: ICD-10-CM

## 2025-06-03 DIAGNOSIS — A52.3 NEUROSYPHILIS: Primary | ICD-10-CM

## 2025-06-03 LAB
ANION GAP SERPL CALCULATED.3IONS-SCNC: 5 MMOL/L (ref 4–13)
BASOPHILS # BLD AUTO: 0.02 THOUSANDS/ÂΜL (ref 0–0.1)
BASOPHILS NFR BLD AUTO: 0 % (ref 0–1)
BUN SERPL-MCNC: 19 MG/DL (ref 5–25)
CALCIUM SERPL-MCNC: 9.7 MG/DL (ref 8.4–10.2)
CHLORIDE SERPL-SCNC: 103 MMOL/L (ref 96–108)
CO2 SERPL-SCNC: 27 MMOL/L (ref 21–32)
CREAT SERPL-MCNC: 0.75 MG/DL (ref 0.6–1.3)
EOSINOPHIL # BLD AUTO: 0.27 THOUSAND/ÂΜL (ref 0–0.61)
EOSINOPHIL NFR BLD AUTO: 3 % (ref 0–6)
ERYTHROCYTE [DISTWIDTH] IN BLOOD BY AUTOMATED COUNT: 14.2 % (ref 11.6–15.1)
GFR SERPL CREATININE-BSD FRML MDRD: 86 ML/MIN/1.73SQ M
GLUCOSE SERPL-MCNC: 98 MG/DL (ref 65–140)
HCT VFR BLD AUTO: 34.5 % (ref 34.8–46.1)
HGB BLD-MCNC: 10.9 G/DL (ref 11.5–15.4)
IMM GRANULOCYTES # BLD AUTO: 0.03 THOUSAND/UL (ref 0–0.2)
IMM GRANULOCYTES NFR BLD AUTO: 0 % (ref 0–2)
LYMPHOCYTES # BLD AUTO: 4.19 THOUSANDS/ÂΜL (ref 0.6–4.47)
LYMPHOCYTES NFR BLD AUTO: 49 % (ref 14–44)
MAGNESIUM SERPL-MCNC: 1.6 MG/DL (ref 1.9–2.7)
MCH RBC QN AUTO: 26.3 PG (ref 26.8–34.3)
MCHC RBC AUTO-ENTMCNC: 31.6 G/DL (ref 31.4–37.4)
MCV RBC AUTO: 83 FL (ref 82–98)
MONOCYTES # BLD AUTO: 0.64 THOUSAND/ÂΜL (ref 0.17–1.22)
MONOCYTES NFR BLD AUTO: 7 % (ref 4–12)
NEUTROPHILS # BLD AUTO: 3.51 THOUSANDS/ÂΜL (ref 1.85–7.62)
NEUTS SEG NFR BLD AUTO: 41 % (ref 43–75)
NRBC BLD AUTO-RTO: 0 /100 WBCS
PLATELET # BLD AUTO: 286 THOUSANDS/UL (ref 149–390)
PMV BLD AUTO: 9.4 FL (ref 8.9–12.7)
POTASSIUM SERPL-SCNC: 4.1 MMOL/L (ref 3.5–5.3)
RBC # BLD AUTO: 4.14 MILLION/UL (ref 3.81–5.12)
SODIUM SERPL-SCNC: 135 MMOL/L (ref 135–147)
WBC # BLD AUTO: 8.66 THOUSAND/UL (ref 4.31–10.16)

## 2025-06-03 PROCEDURE — 83735 ASSAY OF MAGNESIUM: CPT | Performed by: INTERNAL MEDICINE

## 2025-06-03 PROCEDURE — 80048 BASIC METABOLIC PNL TOTAL CA: CPT | Performed by: INTERNAL MEDICINE

## 2025-06-03 PROCEDURE — 85025 COMPLETE CBC W/AUTO DIFF WBC: CPT | Performed by: INTERNAL MEDICINE

## 2025-06-03 PROCEDURE — 99238 HOSP IP/OBS DSCHRG MGMT 30/<: CPT | Performed by: PHYSICIAN ASSISTANT

## 2025-06-03 RX ADMIN — SODIUM CHLORIDE 4 MILLION UNITS: 9 INJECTION, SOLUTION INTRAVENOUS at 00:35

## 2025-06-03 RX ADMIN — SODIUM CHLORIDE 4 MILLION UNITS: 9 INJECTION, SOLUTION INTRAVENOUS at 03:38

## 2025-06-03 NOTE — PROGRESS NOTES
OPAT NOTE    AP ONLY CAMPUSES ARE: [place] and [place].   In these cases, physician is only cosigning notes.    Supervising/Discharge provider: Jeff Siegel    Diagnosis: Neurosyphilis    Drug: Penicillin G    Dose/Route/Frequency: 4 millions unit Q4H    Labs/Frequency: CBCD BMP weekly    End Date: 6/12    Infusion/VNA/SNF contact: [facility]    Next appointment: 6/11, 8:30am

## 2025-06-03 NOTE — TELEPHONE ENCOUNTER
Contacted patient to check on their well-being, as well as inform them of their upcoming ID follow-up appointment and lab work ordered per provider's instructions following patient's recent discharge.    Requested and received patient's consent to speak with daughter-in-law. She states patient is doing well, and that they have receiving their antibiotics as prescribed with no issues. Daughter-in-law informed of upcoming appointment on 6/11 at 8:30am, and I provided them with office address. Patient also made aware of lab orders to be done on a weekly basis by [facility].    Daughter-in-law states they had previously contacted office to request visit be changed to Virtual as they are unable to do an in-person visit under any circumstance on 6/11. The only other availability they would have where someone would be able to transport/accompany patient would be on 6/6, and it would need to be scheduled after 1:45pm. Due to lack of options, patient's visit has been converted to Virtual Visit, and Daughter-in-law states they will either connect via phone number in patient's chart or [employer].    Lastly, I provided patient with office number and encouraged them to call if they have any questions or concerns. Patient verbalizes understanding, and has no further questions at this time.  
breastfeeding exclusively

## 2025-06-03 NOTE — PLAN OF CARE
Problem: Potential for Falls  Goal: Patient will remain free of falls  Description: INTERVENTIONS:  - Educate patient/family on patient safety including physical limitations  - Instruct patient to call for assistance with activity   - Consider consulting OT/PT to assist with strengthening/mobility based on AM PAC & JH-HLM score  - Consult OT/PT to assist with strengthening/mobility   - Keep Call bell within reach  - Keep bed low and locked with side rails adjusted as appropriate  - Keep care items and personal belongings within reach  - Initiate and maintain comfort rounds  - Make Fall Risk Sign visible to staff  - Offer Toileting evbedry 2-4 Hours, in advance of need  - Initiate/Maintain bed alarm  - Obtain necessary fall risk management equipment: call bell near and bed rails up  - Apply yellow socks and bracelet for high fall risk patients  - Consider moving patient to room near nurses station  Outcome: Progressing

## 2025-06-03 NOTE — TELEPHONE ENCOUNTER
Roc, daughter in law, called and requested virtual visit for appt 6/11 at 8:30 with JENNA Weber. She stated patient does not have transportation that day and nothing else available in appropriate time frame. Roc requested a call back with update if able to change appt type to virtual. Patient can join via [employer] if approved. Please advise.    Roc-Daughter in law  Phone: [phone]

## 2025-06-03 NOTE — NURSING NOTE
Patient discharged via wheelchair, son picked her up. No questions at this time. Discharged with right chest wall port for home antibiotic therapy.

## 2025-06-05 ENCOUNTER — TELEPHONE (OUTPATIENT)
Dept: ADMINISTRATIVE | Facility: OTHER | Age: 60
End: 2025-06-05

## 2025-06-05 NOTE — TELEPHONE ENCOUNTER
06/05/25 12:34 PM    Patient contacted to bring Advance Directive, POLST, or Living Will document to next scheduled pcp visit.VBI Department left message.    Thank you.  Orin Darby MA  [facility]

## 2025-06-05 NOTE — PROGRESS NOTES
Transition of Care Visit:  Name: Emily Mejia      : 1965      MRN: [id number]  Encounter Provider: Fransico Torres PA-C  Encounter Date: 2025   Encounter department: [facility]    Assessment & Plan  Neurosyphilis  Patient initially seen by ophthalmology for chronic worsening B/L uveitis and was found to be positive for syphilis   Seen by outpatient Infectious disease, subsequently admitted for IV tx and LP for neurosyphilis   Treated and cleared for d/c with f/u op ID   Today presents with continued difficulty with vision but no worsening, states overall improved since treatment for neurosyphilis        Status post peripherally inserted central catheter (PICC) central line placement  Expected removal - per pt at ID f/u  PICC line examined today in office, no evidence of infection or dehiscence of the PICC line, flowing well per patient based on my examination  Nonocclusive stockinette given to patient to keep apparatus in place, advised monitoring for occlusion       Chronic uveitis, unspecified laterality  Examination revealing clouded cornea and evidence of uveitis, patient states no worsening of vision or discomfort associated  She plans to continue to follow with her ophthalmologist regarding chronic uveitis with corrective procedure to be performed after treatment for infection            History of Present Illness     Transitional Care Management Review:   Emily Mejia is a 60 y.o. female here for TCM follow up.     During the TCM phone call patient stated:  TCM Call (since 2025)       Date and time call was made  6/3/2025  9:49 AM    Hospital care reviewed  Records reviewed    Patient was hospitialized at  [facility]    Date of Admission  25    Date of discharge  25    Diagnosis  Neurosyphilis          TCM Call (since 2025)       I have advised the patient to call PCP with any new or worsening symptoms  Anne Solis MA       "    Emily Mejia is a 60 y.o. female with significant Hx of neurosyphilis  presenting for TCM, admission 5/29/2025 - 6/3/2025 (5 days) [facility]. Hospital discharge course provided by Danial Torre MD:  \"...originally presented to the hospital on 5/29/2025 due to concerns for neurosyphilis.  Patient was referred to ID by ophthalmology due to worsening visual acuity and unimproving bilateral uveitis.  ID referred her to the hospital for immediate workup of neurosyphilis.  She underwent lumbar puncture with IR for CSF fluid confirmation.  Upon admission, vital signs were stable with no SIRS criteria met.  Remainder of her admission workup was unrevealing.  She was started on IV penicillin immediately following lumbar puncture.  ID was consulted and recommended continued IV penicillin and monitoring patient's tolerance of treatment including  Jarisch- Herxheimer reaction.  Patient did in fact tolerate penicillin and experienced no side effects.  A PICC line was placed on 5/30.  CSF culture was without growth and VDRL was negative however ID suggest this can have poor sensitivity and recommended completing empiric treatment course for neurosyphilis.  They recommend 14 days total but do not recommend repeat LP after completion of treatment given that CSF results are only mildly inflammatory as mentioned.  She will need continued ophthalmology follow-up as well.  She was arranged for IV antibiotics and home care and medically stable for discharge home on 6/3/2025.\"    Today presents stable and improved since admission, no acute concerns. PICC line in place and flowing. Scheduled to see ID.             **Note: Portions of the record may have been created with voice recognition software.  Occasional wrong word or \"sound alike\" substitutions may have occurred due to the inherent limitations of voice recognition software.  Please read the chart carefully and recognize, using context, where " "substitutions have occurred. Please contact for further clarification, when necessary.       Review of Systems   Constitutional:  Negative for chills and fever.   HENT:  Negative for ear pain and sore throat.    Eyes:  Positive for visual disturbance (uvietis). Negative for pain.   Respiratory:  Negative for cough and shortness of breath.    Cardiovascular:  Negative for chest pain and palpitations.   Gastrointestinal:  Negative for abdominal pain and vomiting.   Genitourinary:  Negative for dysuria and hematuria.   Musculoskeletal:  Negative for arthralgias and back pain.   Skin:  Negative for color change and rash.   Neurological:  Negative for seizures and syncope.   All other systems reviewed and are negative.    Objective   /64   Pulse 65   Temp (!) 97.3 °F (36.3 °C)   Ht 5' 3\" (1.6 m)   Wt 74.8 kg (165 lb)   SpO2 100%   BMI 29.23 kg/m²     Physical Exam  Vitals and nursing note reviewed.   Constitutional:       General: She is not in acute distress.     Appearance: She is well-developed.   HENT:      Head: Normocephalic and atraumatic.     Eyes:      General: Visual field deficit present.      Conjunctiva/sclera: Conjunctivae normal.      Comments: Uvietis, more apparent left sided, no evidence of acute worsening or infection     Cardiovascular:      Rate and Rhythm: Normal rate and regular rhythm.      Heart sounds: No murmur heard.  Pulmonary:      Effort: Pulmonary effort is normal. No respiratory distress.      Breath sounds: Normal breath sounds.   Abdominal:      Palpations: Abdomen is soft.      Tenderness: There is no abdominal tenderness.     Musculoskeletal:         General: No swelling.      Cervical back: Neck supple.     Skin:     General: Skin is warm and dry.     Neurological:      Mental Status: She is alert and oriented to person, place, and time.     Psychiatric:         Mood and Affect: Mood normal.       Medications have been reviewed by provider in current encounter      "

## 2025-06-05 NOTE — ASSESSMENT & PLAN NOTE
Patient initially seen by ophthalmology for chronic worsening B/L uveitis and was found to be positive for syphilis   Seen by outpatient Infectious disease, subsequently admitted for IV tx and LP for neurosyphilis   Treated and cleared for d/c with f/u op ID   Today presents with continued difficulty with vision but no worsening, states overall improved since treatment for neurosyphilis

## 2025-06-06 ENCOUNTER — OFFICE VISIT (OUTPATIENT)
Dept: FAMILY MEDICINE CLINIC | Facility: CLINIC | Age: 60
End: 2025-06-06
Payer: MEDICARE

## 2025-06-06 VITALS
OXYGEN SATURATION: 100 % | SYSTOLIC BLOOD PRESSURE: 124 MMHG | WEIGHT: 165 LBS | HEART RATE: 65 BPM | DIASTOLIC BLOOD PRESSURE: 64 MMHG | BODY MASS INDEX: 29.23 KG/M2 | TEMPERATURE: 97.3 F | HEIGHT: 63 IN

## 2025-06-06 DIAGNOSIS — H20.10 CHRONIC UVEITIS, UNSPECIFIED LATERALITY: ICD-10-CM

## 2025-06-06 DIAGNOSIS — A52.3 NEUROSYPHILIS: Primary | ICD-10-CM

## 2025-06-06 DIAGNOSIS — Z95.828 STATUS POST PERIPHERALLY INSERTED CENTRAL CATHETER (PICC) CENTRAL LINE PLACEMENT: ICD-10-CM

## 2025-06-06 PROCEDURE — 99495 TRANSJ CARE MGMT MOD F2F 14D: CPT

## 2025-06-06 NOTE — ASSESSMENT & PLAN NOTE
Examination revealing clouded cornea and evidence of uveitis, patient states no worsening of vision or discomfort associated  She plans to continue to follow with her ophthalmologist regarding chronic uveitis with corrective procedure to be performed after treatment for infection

## 2025-06-11 ENCOUNTER — TELEMEDICINE (OUTPATIENT)
Dept: INFECTIOUS DISEASES | Facility: CLINIC | Age: 60
End: 2025-06-11
Payer: MEDICARE

## 2025-06-11 DIAGNOSIS — A52.3 NEUROSYPHILIS: Primary | ICD-10-CM

## 2025-06-11 DIAGNOSIS — H20.9 UVEITIS: ICD-10-CM

## 2025-06-11 PROCEDURE — G2211 COMPLEX E/M VISIT ADD ON: HCPCS | Performed by: STUDENT IN AN ORGANIZED HEALTH CARE EDUCATION/TRAINING PROGRAM

## 2025-06-11 PROCEDURE — 99214 OFFICE O/P EST MOD 30 MIN: CPT | Performed by: STUDENT IN AN ORGANIZED HEALTH CARE EDUCATION/TRAINING PROGRAM

## 2025-06-11 NOTE — PATIENT INSTRUCTIONS
-continue IV Penicillin until 6/12  -6/13, will go to the [facility] infusion center for a one time dose IM Penicillin due to prolonged duration of symptoms  -check labs in 6 months

## 2025-06-11 NOTE — ASSESSMENT & PLAN NOTE
Work up initiated by patient's new ophthalmologist in setting of chronic worsening bilateral uveitis. Patient with positive syphilis screen and confirmatory second treponema antibody also positive. RPR non reactive. She denies prior syphilis history and treatment. Given ocular manifestations, neurosyphilis likely. LP showed CSF fluid WBC = 7, 30% neutrophils and 63% lymphs.  CSF protein = 54. CSF VDRL negative. Gram stain and formal culture without growth. Given elevated protein and WBC count, LP diagnostic of neurosyphilis. She is tolerating IV PCN G via CADD pump without side effects.  Recent labs reviewed show normal WBC count and creatinine   -continue IV Penicillin G 4 million units q4 hours through 6/12/2025 for 14 days total treatment  -weekly CBCD and BMP while on IV antibiotic  -PICC to be removed after final dose of IV antibiotic on 6/12/2025  -given likely prolonged infection with neurosyphilis, recommend additional dose IM PCN G 2.4 mil units after completion of IV. Orders placed for this to be performed 6/13 at the [facility]  -repeat RPR 6 months to ensure no increase  -no need to repeat LP unless she develops new symptoms due to mildly inflammatory fluid  -discussed prognosis with the patient and daughter, right eye vision unlikely to improve, left eye vision may or may not improve due to duration of infection  Orders:    RPR (Monitor) W/Refl Titer; Future

## 2025-06-11 NOTE — PROGRESS NOTES
Administrative Statements   Encounter provider Zully Baker MD    The Patient is located at Home and in the following state in which I hold an active license [place].    The patient was identified by name and date of birth. Emily Mejia was informed that this is a telemedicine visit and that the visit is being conducted through the Epic Embedded platform. She agrees to proceed..  My office door was closed. No one else was in the room.  She acknowledged consent and understanding of privacy and security of the video platform. The patient has agreed to participate and understands they can discontinue the visit at any time.    I have spent a total time of 30 minutes in caring for this patient on the day of the visit/encounter including Diagnostic results, Prognosis, Risks and benefits of tx options, Impressions, Counseling / Coordination of care, Documenting in the medical record, and Reviewing/placing orders in the medical record (including tests, medications, and/or procedures), not including the time spent for establishing the audio/video connection.       Name: Emily Mejia      : 1965      MRN: [id number]  Encounter Provider: Zully Baker MD  Encounter Date: 2025   Encounter department: [facility]  :  Assessment & Plan  Neurosyphilis  Work up initiated by patient's new ophthalmologist in setting of chronic worsening bilateral uveitis. Patient with positive syphilis screen and confirmatory second treponema antibody also positive. RPR non reactive. She denies prior syphilis history and treatment. Given ocular manifestations, neurosyphilis likely. LP showed CSF fluid WBC = 7, 30% neutrophils and 63% lymphs.  CSF protein = 54. CSF VDRL negative. Gram stain and formal culture without growth. Given elevated protein and WBC count, LP diagnostic of neurosyphilis. She is tolerating IV PCN G via CADD pump without side effects.  Recent labs reviewed show normal WBC count and  creatinine   -continue IV Penicillin G 4 million units q4 hours through 6/12/2025 for 14 days total treatment  -weekly CBCD and BMP while on IV antibiotic  -PICC to be removed after final dose of IV antibiotic on 6/12/2025  -given likely prolonged infection with neurosyphilis, recommend additional dose IM PCN G 2.4 mil units after completion of IV. Orders placed for this to be performed 6/13 at [facility]  -repeat RPR 6 months to ensure no increase  -no need to repeat LP unless she develops new symptoms due to mildly inflammatory fluid  -discussed prognosis with the patient and daughter, right eye vision unlikely to improve, left eye vision may or may not improve due to duration of infection  Orders:    RPR (Monitor) W/Refl Titer; Future    Uveitis  With R eye nearly blind. Likely secondary to neurosyphilis given recent positive syphilis screening. Patient follows outpatient with ophthalmology   -antibiotic as above  -resume outpatient ophthalmology follow up after discharge       Antibiotics: PCN G    History of Present Illness   The patient presents for follow up of neuro/ocular syphilis. She is receiving IV PCN G via CADD pump through PICC for 14 days, through 6/12. She states that she is no longer having headaches and energy is better. She is tolerating IV PCN without side effects. No fever, chills, rash. No issues with the RUE PICC. 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 Patient accompanied by her daughter.    ROS:  A complete review of systems is negative other than that noted above in the HPI.     Objective   There were no vitals taken for this visit.     General: Alert, interactive, appearing well, nontoxic, no acute distress.  Eyes: right eye blindness  Mouth: no oral lesions  Lungs: Respirations unlabored  Extremities: No clubbing, cyanosis or edema. RUE PICC in place  Skin: No rashes on visible skin    Lab Results: I  have personally reviewed pertinent labs.  Lab Results   Component Value Date    K 4.1 06/03/2025     06/03/2025    CO2 27 06/03/2025    BUN 19 06/03/2025    CREATININE 0.75 06/03/2025    GLUF 78 05/21/2025    CALCIUM 9.7 06/03/2025    AST 16 05/29/2025    ALT 11 05/29/2025    ALKPHOS 72 05/29/2025    EGFR 86 06/03/2025     Lab Results   Component Value Date    WBC 8.66 06/03/2025    HGB 10.9 (L) 06/03/2025    HCT 34.5 (L) 06/03/2025    MCV 83 06/03/2025     06/03/2025     Lab Results   Component Value Date     (H) 10/01/2024

## 2025-06-11 NOTE — LETTER
2025     Jeff Siegel MD  [address] include 234 beds at the [facility] [address] [place] [id number]    Patient: Emily Mejia   YOB: 1965   Date of Visit: 2025       Dear MD Sarah Alvarez PA-C:    Thank you for referring Emily Mejia to me for evaluation. Below are my notes for this consultation.    If you have questions, please do not hesitate to call me. I look forward to following your patient along with you.         Sincerely,        Zully Baker MD        CC: DEDE Marroquin MD  2025 10:18 AM  Incomplete  Administrative Statements  Encounter provider Zully Baker MD    The Patient is located at Home and in the following state in which I hold an active license [place].    The patient was identified by name and date of birth. Emily Mejia was informed that this is a telemedicine visit and that the visit is being conducted through the Epic Embedded platform. She agrees to proceed..  My office door was closed. No one else was in the room.  She acknowledged consent and understanding of privacy and security of the video platform. The patient has agreed to participate and understands they can discontinue the visit at any time.    I have spent a total time of 30 minutes in caring for this patient on the day of the visit/encounter including Diagnostic results, Prognosis, Risks and benefits of tx options, Impressions, Counseling / Coordination of care, Documenting in the medical record, and Reviewing/placing orders in the medical record (including tests, medications, and/or procedures), not including the time spent for establishing the audio/video connection.       Name: Emily Mejia      : 1965      MRN: [id number]  Encounter Provider: Zully Baker MD  Encounter Date: 2025   Encounter department: [facility]  :  Assessment & Plan  Neurosyphilis  Work up initiated by patient's new ophthalmologist in setting of chronic  worsening bilateral uveitis. Patient with positive syphilis screen and confirmatory second treponema antibody also positive. RPR non reactive. She denies prior syphilis history and treatment. Given ocular manifestations, neurosyphilis likely. LP showed CSF fluid WBC = 7, 30% neutrophils and 63% lymphs.  CSF protein = 54. CSF VDRL negative. Gram stain and formal culture without growth. Given elevated protein and WBC count, LP diagnostic of neurosyphilis. She is tolerating IV PCN G via CADD pump without side effects.    -continue IV Penicillin G 4 million units q4 hours through 6/12/2025 for 14 days total treatment  -weekly CBCD and BMP while on IV antibiotic  -follow up final CSF VDRL fluid study  -monitor vitals  -serial neuro exams  -PICC to be removed after final dose of IV antibiotic on 6/12/2025  -patient to follow up in the outpatient ID office after discharge, appointment information placed in the discharge planning  Orders:  •  RPR (Monitor) W/Refl Titer; Future    Uveitis  With R eye nearly blind. Likely secondary to neurosyphilis given recent positive syphilis screening. Patient follows outpatient with ophthalmology   -antibiotic as above  -resume outpatient ophthalmology follow up after discharge           Antibiotics: PCN G    History of Present Illness  The patient presents for follow up of neuro/ocular syphilis. She is receiving IV PCN G via CADD pump through PICC for 14 days, through 6/12. She states that she is no longer having headaches and energy is better. She is tolerating IV PCN without side effects. No fever, chills, rash. No issues with the RUE PICC. 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 Patient accompanied by her daughter.    ROS:  A complete review of systems is negative other than that noted above in the HPI.     Objective  There were no vitals taken for this visit.     General: Alert, interactive, appearing well,  nontoxic, no acute distress.  Throat: Oropharynx moist without lesions.   Lungs: Clear to auscultation bilaterally; no audible wheezes, rhonchi or rales; respirations unlabored  Heart: RRR; no murmur, rub or gallop  Abdomen: Soft, non-tender, non-distended, positive bowel sounds.    Extremities: No clubbing, cyanosis or edema  Skin: No new rashes or lesions. No new draining wounds.    Lab Results: I have personally reviewed pertinent labs.  Lab Results   Component Value Date    K 4.1 2025     2025    CO2 27 2025    BUN 19 2025    CREATININE 0.75 2025    GLUF 78 2025    CALCIUM 9.7 2025    AST 16 2025    ALT 11 2025    ALKPHOS 72 2025    EGFR 86 2025     Lab Results   Component Value Date    WBC 8.66 2025    HGB 10.9 (L) 2025    HCT 34.5 (L) 2025    MCV 83 2025     2025     Lab Results   Component Value Date     (H) 10/01/2024       Zully Baker MD  2025  9:17 AM  Sign when Signing Visit  Name: Emily Mejia      : 1965      MRN: [id number]  Encounter Provider: Zully Baker MD  Encounter Date: 2025   Encounter department: [facility]  :  Assessment & Plan  Neurosyphilis             Antibiotics: PCN G    History of Present Illness  Patient has no fever, chills, sweats; no nausea, vomiting, diarrhea; no cough, shortness of breath; no pain. No new symptoms.    ROS:  A complete review of systems is negative other than that noted above in the HPI.         Objective  There were no vitals taken for this visit.     General: Alert, interactive, appearing well, nontoxic, no acute distress.  Throat: Oropharynx moist without lesions.   Lungs: Clear to auscultation bilaterally; no audible wheezes, rhonchi or rales; respirations unlabored  Heart: RRR; no murmur, rub or gallop  Abdomen: Soft, non-tender, non-distended, positive bowel sounds.    Extremities: No  clubbing, cyanosis or edema  Skin: No new rashes or lesions. No new draining wounds.    Lab Results: I have personally reviewed pertinent labs.  Lab Results   Component Value Date    K 4.1 06/03/2025     06/03/2025    CO2 27 06/03/2025    BUN 19 06/03/2025    CREATININE 0.75 06/03/2025    GLUF 78 05/21/2025    CALCIUM 9.7 06/03/2025    AST 16 05/29/2025    ALT 11 05/29/2025    ALKPHOS 72 05/29/2025    EGFR 86 06/03/2025     Lab Results   Component Value Date    WBC 8.66 06/03/2025    HGB 10.9 (L) 06/03/2025    HCT 34.5 (L) 06/03/2025    MCV 83 06/03/2025     06/03/2025     Lab Results   Component Value Date     (H) 10/01/2024

## 2025-06-13 ENCOUNTER — HOSPITAL ENCOUNTER (OUTPATIENT)
Dept: INFUSION CENTER | Facility: CLINIC | Age: 60
End: 2025-06-13
Attending: STUDENT IN AN ORGANIZED HEALTH CARE EDUCATION/TRAINING PROGRAM
Payer: MEDICARE

## 2025-06-13 DIAGNOSIS — A52.3 NEUROSYPHILIS: Primary | ICD-10-CM

## 2025-06-13 PROCEDURE — 96372 THER/PROPH/DIAG INJ SC/IM: CPT

## 2025-06-13 RX ADMIN — PENICILLIN G BENZATHINE 2.4 MILLION UNITS: 1200000 INJECTION, SUSPENSION INTRAMUSCULAR at 15:13

## 2025-06-13 NOTE — PROGRESS NOTES
Patient presents today for bicillin L-A injection offering no complaints. Patient tolerated injection well in the right and left gluteal area.  AVS declined Patient has no further appointments at this time.

## 2025-07-01 ENCOUNTER — RA CDI HCC (OUTPATIENT)
Dept: OTHER | Facility: HOSPITAL | Age: 60
End: 2025-07-01

## 2025-07-02 ENCOUNTER — TELEPHONE (OUTPATIENT)
Dept: ADMINISTRATIVE | Facility: OTHER | Age: 60
End: 2025-07-02

## 2025-07-02 NOTE — TELEPHONE ENCOUNTER
07/02/25 10:43 AM    Patient contacted to bring Advance Directive, POLST, or Living Will document to next scheduled pcp visit.[facility] Department spoke with patient/ caregiver.    Thank you.  Orin Darby MA  [facility]

## 2025-07-03 ENCOUNTER — OFFICE VISIT (OUTPATIENT)
Dept: FAMILY MEDICINE CLINIC | Facility: CLINIC | Age: 60
End: 2025-07-03
Payer: MEDICARE

## 2025-07-03 ENCOUNTER — APPOINTMENT (OUTPATIENT)
Dept: LAB | Facility: CLINIC | Age: 60
End: 2025-07-03
Payer: MEDICARE

## 2025-07-03 VITALS
SYSTOLIC BLOOD PRESSURE: 134 MMHG | OXYGEN SATURATION: 100 % | WEIGHT: 166 LBS | BODY MASS INDEX: 29.41 KG/M2 | HEART RATE: 76 BPM | HEIGHT: 63 IN | DIASTOLIC BLOOD PRESSURE: 68 MMHG | TEMPERATURE: 98.6 F

## 2025-07-03 DIAGNOSIS — I10 PRIMARY HYPERTENSION: ICD-10-CM

## 2025-07-03 DIAGNOSIS — D64.9 CHRONIC ANEMIA: Primary | ICD-10-CM

## 2025-07-03 DIAGNOSIS — Z12.12 SCREENING FOR COLORECTAL CANCER: ICD-10-CM

## 2025-07-03 DIAGNOSIS — D64.9 CHRONIC ANEMIA: ICD-10-CM

## 2025-07-03 DIAGNOSIS — A52.3 NEUROSYPHILIS: ICD-10-CM

## 2025-07-03 DIAGNOSIS — Z12.11 SCREENING FOR COLORECTAL CANCER: ICD-10-CM

## 2025-07-03 LAB
BASOPHILS # BLD AUTO: 0.03 THOUSANDS/ÂΜL (ref 0–0.1)
BASOPHILS NFR BLD AUTO: 0 % (ref 0–1)
CRP SERPL QL: 2.7 MG/L
EOSINOPHIL # BLD AUTO: 0.16 THOUSAND/ÂΜL (ref 0–0.61)
EOSINOPHIL NFR BLD AUTO: 2 % (ref 0–6)
ERYTHROCYTE [DISTWIDTH] IN BLOOD BY AUTOMATED COUNT: 14.1 % (ref 11.6–15.1)
FERRITIN SERPL-MCNC: 208 NG/ML (ref 30–307)
HCT VFR BLD AUTO: 33.9 % (ref 34.8–46.1)
HGB BLD-MCNC: 10.9 G/DL (ref 11.5–15.4)
IMM GRANULOCYTES # BLD AUTO: 0.02 THOUSAND/UL (ref 0–0.2)
IMM GRANULOCYTES NFR BLD AUTO: 0 % (ref 0–2)
IRON SATN MFR SERPL: 20 % (ref 15–50)
IRON SERPL-MCNC: 63 UG/DL (ref 50–212)
LYMPHOCYTES # BLD AUTO: 5.03 THOUSANDS/ÂΜL (ref 0.6–4.47)
LYMPHOCYTES NFR BLD AUTO: 51 % (ref 14–44)
MCH RBC QN AUTO: 26.7 PG (ref 26.8–34.3)
MCHC RBC AUTO-ENTMCNC: 32.2 G/DL (ref 31.4–37.4)
MCV RBC AUTO: 83 FL (ref 82–98)
MONOCYTES # BLD AUTO: 0.51 THOUSAND/ÂΜL (ref 0.17–1.22)
MONOCYTES NFR BLD AUTO: 5 % (ref 4–12)
NEUTROPHILS # BLD AUTO: 4.09 THOUSANDS/ÂΜL (ref 1.85–7.62)
NEUTS SEG NFR BLD AUTO: 42 % (ref 43–75)
NRBC BLD AUTO-RTO: 0 /100 WBCS
PLATELET # BLD AUTO: 294 THOUSANDS/UL (ref 149–390)
PMV BLD AUTO: 10.6 FL (ref 8.9–12.7)
RBC # BLD AUTO: 4.09 MILLION/UL (ref 3.81–5.12)
TIBC SERPL-MCNC: 319.2 UG/DL (ref 250–450)
TRANSFERRIN SERPL-MCNC: 228 MG/DL (ref 203–362)
UIBC SERPL-MCNC: 256 UG/DL (ref 155–355)
WBC # BLD AUTO: 9.84 THOUSAND/UL (ref 4.31–10.16)

## 2025-07-03 PROCEDURE — 83550 IRON BINDING TEST: CPT

## 2025-07-03 PROCEDURE — 86140 C-REACTIVE PROTEIN: CPT

## 2025-07-03 PROCEDURE — 82652 VIT D 1 25-DIHYDROXY: CPT

## 2025-07-03 PROCEDURE — 36415 COLL VENOUS BLD VENIPUNCTURE: CPT

## 2025-07-03 PROCEDURE — 82728 ASSAY OF FERRITIN: CPT

## 2025-07-03 PROCEDURE — 83540 ASSAY OF IRON: CPT

## 2025-07-03 PROCEDURE — 82164 ANGIOTENSIN I ENZYME TEST: CPT

## 2025-07-03 PROCEDURE — 85025 COMPLETE CBC W/AUTO DIFF WBC: CPT

## 2025-07-03 PROCEDURE — 99214 OFFICE O/P EST MOD 30 MIN: CPT

## 2025-07-03 PROCEDURE — G2211 COMPLEX E/M VISIT ADD ON: HCPCS

## 2025-07-03 RX ORDER — AMLODIPINE BESYLATE 5 MG/1
5 TABLET ORAL DAILY
Qty: 90 TABLET | Refills: 0 | Status: SHIPPED | OUTPATIENT
Start: 2025-07-03

## 2025-07-03 NOTE — ASSESSMENT & PLAN NOTE
Chronic anemia  Per patient she was following with a hematologist in [place] and was receiving iron infusions  Will check CBC and iron panel  If iron low, can order infusions to be completed  Will check FIT test as well     Orders:    CBC and differential; Future    Iron Panel (Includes Ferritin, Iron Sat%, Iron, and TIBC); Future    iFOBT (FIT); Future

## 2025-07-03 NOTE — ASSESSMENT & PLAN NOTE
BP well controlled. 134/68 mmHg. Similar home readings.   Therefore will continue amlodipine 5 mg daily. Refilled today.   Follow up in six months. Sooner as needed.     Orders:    amLODIPine (NORVASC) 5 mg tablet; Take 1 tablet (5 mg total) by mouth daily

## 2025-07-03 NOTE — PROGRESS NOTES
"Name: Emily Mejia      : 1965      MRN: [id number]  Encounter Provider: Sarah Hull PA-C  Encounter Date: 7/3/2025   Encounter department: [facility]  :  Assessment & Plan  Chronic anemia  Chronic anemia  Per patient she was following with a hematologist in [place] and was receiving iron infusions  Will check CBC and iron panel  If iron low, can order infusions to be completed  Will check FIT test as well     Orders:    CBC and differential; Future    Iron Panel (Includes Ferritin, Iron Sat%, Iron, and TIBC); Future    iFOBT (FIT); Future    Screening for colorectal cancer    Orders:    iFOBT (FIT); Future    Primary hypertension  BP well controlled. 134/68 mmHg. Similar home readings.   Therefore will continue amlodipine 5 mg daily. Refilled today.   Follow up in six months. Sooner as needed.     Orders:    amLODIPine (NORVASC) 5 mg tablet; Take 1 tablet (5 mg total) by mouth daily    Neurosyphilis  Following with infectious disease  Treatment completed              History of Present Illness   CC: blood pressure check    Pt presents for BP check  While in the hospital, they started her on amlodipine 5 mg daily   Reports home readings have been 130/70s  She has been doing well  No acute complaints     Review of Systems   Respiratory:  Negative for chest tightness and shortness of breath.    Cardiovascular:  Negative for chest pain, palpitations and leg swelling.   Neurological:  Negative for dizziness, light-headedness and headaches.       Objective   /68   Pulse 76   Temp 98.6 °F (37 °C)   Ht 5' 3\" (1.6 m)   Wt 75.3 kg (166 lb)   SpO2 100%   BMI 29.41 kg/m²      Physical Exam  Vitals reviewed.   Constitutional:       General: She is not in acute distress.     Appearance: Normal appearance. She is not ill-appearing or diaphoretic.   HENT:      Head: Normocephalic and atraumatic.      Nose: Nose normal.      Mouth/Throat:      Mouth: Mucous membranes are moist. "     Cardiovascular:      Rate and Rhythm: Normal rate and regular rhythm.      Heart sounds: Normal heart sounds.   Pulmonary:      Effort: Pulmonary effort is normal. No respiratory distress.      Breath sounds: Normal breath sounds. No wheezing, rhonchi or rales.     Musculoskeletal:      Right lower leg: No edema.      Left lower leg: No edema.     Skin:     General: Skin is warm.     Neurological:      General: No focal deficit present.      Mental Status: She is alert.      Gait: Gait normal.     Psychiatric:         Mood and Affect: Mood normal.

## 2025-07-04 LAB — ACE SERPL-CCNC: 67 U/L (ref 14–82)

## 2025-07-06 DIAGNOSIS — D64.9 CHRONIC ANEMIA: Primary | ICD-10-CM

## 2025-07-06 DIAGNOSIS — L52 ERYTHEMA NODOSUM: ICD-10-CM

## 2025-07-06 DIAGNOSIS — Z79.899 LONG TERM USE OF DRUG: ICD-10-CM

## 2025-07-07 LAB — 1,25(OH)2D SERPL-MCNC: 36.1 PG/ML (ref 5–200)

## 2025-07-18 ENCOUNTER — TELEPHONE (OUTPATIENT)
Age: 60
End: 2025-07-18

## 2025-07-18 NOTE — TELEPHONE ENCOUNTER
[facility] called for all patients office visit notes from 6/11/25 and 5/29/25. Please fax to number provided.    [facility]  FAX: [phone]

## 2025-08-09 ENCOUNTER — APPOINTMENT (OUTPATIENT)
Dept: LAB | Facility: HOSPITAL | Age: 60
End: 2025-08-09
Payer: MEDICARE

## 2025-08-13 ENCOUNTER — TELEPHONE (OUTPATIENT)
Dept: ADMINISTRATIVE | Facility: OTHER | Age: 60
End: 2025-08-13

## 2025-08-14 ENCOUNTER — CONSULT (OUTPATIENT)
Dept: FAMILY MEDICINE CLINIC | Facility: CLINIC | Age: 60
End: 2025-08-14
Payer: MEDICARE

## 2025-08-18 ENCOUNTER — E-CONSULT (OUTPATIENT)
Age: 60
End: 2025-08-18
Payer: MEDICARE

## 2025-08-18 DIAGNOSIS — D64.9 CHRONIC ANEMIA: Primary | ICD-10-CM

## 2025-08-18 PROCEDURE — 99451 NTRPROF PH1/NTRNET/EHR 5/>: CPT | Performed by: NURSE PRACTITIONER

## 2025-08-20 ENCOUNTER — TELEPHONE (OUTPATIENT)
Dept: FAMILY MEDICINE CLINIC | Facility: CLINIC | Age: 60
End: 2025-08-20

## 2025-08-20 ENCOUNTER — TELEPHONE (OUTPATIENT)
Age: 60
End: 2025-08-20